# Patient Record
Sex: FEMALE | Race: WHITE | NOT HISPANIC OR LATINO
[De-identification: names, ages, dates, MRNs, and addresses within clinical notes are randomized per-mention and may not be internally consistent; named-entity substitution may affect disease eponyms.]

---

## 2019-12-12 ENCOUNTER — APPOINTMENT (OUTPATIENT)
Dept: ENDOCRINOLOGY | Facility: CLINIC | Age: 69
End: 2019-12-12
Payer: COMMERCIAL

## 2019-12-12 VITALS
SYSTOLIC BLOOD PRESSURE: 120 MMHG | HEART RATE: 75 BPM | BODY MASS INDEX: 31.83 KG/M2 | HEIGHT: 62 IN | OXYGEN SATURATION: 96 % | DIASTOLIC BLOOD PRESSURE: 60 MMHG | WEIGHT: 173 LBS

## 2019-12-12 DIAGNOSIS — Z80.8 FAMILY HISTORY OF MALIGNANT NEOPLASM OF OTHER ORGANS OR SYSTEMS: ICD-10-CM

## 2019-12-12 DIAGNOSIS — Z83.49 FAMILY HISTORY OF OTHER ENDOCRINE, NUTRITIONAL AND METABOLIC DISEASES: ICD-10-CM

## 2019-12-12 DIAGNOSIS — Z83.518 FAMILY HISTORY OF OTHER SPECIFIED EYE DISORDER: ICD-10-CM

## 2019-12-12 DIAGNOSIS — Z80.0 FAMILY HISTORY OF MALIGNANT NEOPLASM OF DIGESTIVE ORGANS: ICD-10-CM

## 2019-12-12 DIAGNOSIS — Z80.52 FAMILY HISTORY OF MALIGNANT NEOPLASM OF BLADDER: ICD-10-CM

## 2019-12-12 DIAGNOSIS — Z86.39 PERSONAL HISTORY OF OTHER ENDOCRINE, NUTRITIONAL AND METABOLIC DISEASE: ICD-10-CM

## 2019-12-12 DIAGNOSIS — Z82.62 FAMILY HISTORY OF OSTEOPOROSIS: ICD-10-CM

## 2019-12-12 DIAGNOSIS — M79.7 FIBROMYALGIA: ICD-10-CM

## 2019-12-12 DIAGNOSIS — Z78.9 OTHER SPECIFIED HEALTH STATUS: ICD-10-CM

## 2019-12-12 PROCEDURE — 99214 OFFICE O/P EST MOD 30 MIN: CPT

## 2019-12-20 PROBLEM — M79.7 FIBROMYALGIA: Status: ACTIVE | Noted: 2019-12-12

## 2019-12-20 NOTE — HISTORY OF PRESENT ILLNESS
[FreeTextEntry1] : Ms. BRAYDEN BRITT   is a 69 year  year old  female who returns today in follow up with regard to a history of hypothyroidism.  Hypothyroidism has been present for over 20 years. Mother has history of thyroid disease. She  is currently taking Levothyroxine 50 mcg daily.  Recent tsh returned at 50 per her primary physician,  Dr. Susana Smalls.She has been compliant in taking the LT4 daily, away from food or any medication that may inhibit absorption. She  has tolerated this medication well without any apparent adverse effects.  She denies any temperature intolerance, significant weight changes, or severe fatigue. She  in addition denies any palpitations, tremors, anxiousness, change in bowel habits or significant change in moods. \par \par She too has several year hx of Priamry HPT with calcium per pt's record on her phone  showing values in the 11 range with latest per Dr. Smalls at 11.4. She states she knows about HPT around 10 yrs.\par Additional medical history includes : fibromyalgia, hyperthyroidism, Osteoporosis\par Patient feels she had 24 hr urine for calcium but cant locate-she will peruse.\par No hx  of kidney stones\par \par From dec 12/18 CTX on one occasion was elevated at 924.\par Notes extreme fatigue.  Lost a very gppd friend at age 62 to ALS\par Dexa: 2/2018\par T scores: L1-L4= -2.1\par Left femoral neck= 1.7\par left distal radius= -2.7 \par  since exam 11/28/16\par  lumbar spine decreased 4.0%\par  Left femoral neck increased  2.3%\par Since exam 12/29/11\par  lumbar spine ;  decreased 9.3%\par left femoral neck decreased 9.2%\par \par On weight watcher for the past two weeks and has loss 5 lbs.  \par Recent labs Tsh=  5.0; vitamin d 28.0., alexis = 11.4, A1c =5.1

## 2019-12-20 NOTE — PHYSICAL EXAM
[Alert] : alert [No Acute Distress] : no acute distress [Well Nourished] : well nourished [Normal Sclera/Conjunctiva] : normal sclera/conjunctiva [Well Developed] : well developed [No Proptosis] : no proptosis [EOMI] : extra ocular movement intact [No Thyroid Nodules] : there were no palpable thyroid nodules [Thyroid Not Enlarged] : the thyroid was not enlarged [Normal Oropharynx] : the oropharynx was normal [No Accessory Muscle Use] : no accessory muscle use [No Respiratory Distress] : no respiratory distress [Clear to Auscultation] : lungs were clear to auscultation bilaterally [Normal Rate] : heart rate was normal  [Normal S1, S2] : normal S1 and S2 [Regular Rhythm] : with a regular rhythm [Pedal Pulses Normal] : the pedal pulses are present [Normal Bowel Sounds] : normal bowel sounds [No Edema] : there was no peripheral edema [Not Tender] : non-tender [Not Distended] : not distended [Soft] : abdomen soft [Post Cervical Nodes] : posterior cervical nodes [Anterior Cervical Nodes] : anterior cervical nodes [Axillary Nodes] : axillary nodes [No Spinal Tenderness] : no spinal tenderness [Spine Straight] : spine straight [Normal] : normal and non tender [Normal Gait] : normal gait [Normal Strength/Tone] : muscle strength and tone were normal [No Stigmata of Cushings Syndrome] : no stigmata of cushings syndrome [Normal Reflexes] : deep tendon reflexes were 2+ and symmetric [No Rash] : no rash [Oriented x3] : oriented to person, place, and time [No Tremors] : no tremors [Acanthosis Nigricans] : no acanthosis nigricans

## 2020-07-20 ENCOUNTER — APPOINTMENT (OUTPATIENT)
Dept: ENDOCRINOLOGY | Facility: CLINIC | Age: 70
End: 2020-07-20
Payer: COMMERCIAL

## 2020-07-20 VITALS
BODY MASS INDEX: 31.09 KG/M2 | RESPIRATION RATE: 16 BRPM | OXYGEN SATURATION: 98 % | HEART RATE: 74 BPM | TEMPERATURE: 97 F | WEIGHT: 170 LBS | SYSTOLIC BLOOD PRESSURE: 108 MMHG | DIASTOLIC BLOOD PRESSURE: 62 MMHG

## 2020-07-20 DIAGNOSIS — E04.1 NONTOXIC SINGLE THYROID NODULE: ICD-10-CM

## 2020-07-20 PROCEDURE — 99214 OFFICE O/P EST MOD 30 MIN: CPT

## 2020-07-20 NOTE — PHYSICAL EXAM
[Alert] : alert [Well Nourished] : well nourished [No Acute Distress] : no acute distress [Well Developed] : well developed [EOMI] : extra ocular movement intact [Normal Sclera/Conjunctiva] : normal sclera/conjunctiva [No Proptosis] : no proptosis [Thyroid Not Enlarged] : the thyroid was not enlarged [Normal Oropharynx] : the oropharynx was normal [No Respiratory Distress] : no respiratory distress [No Accessory Muscle Use] : no accessory muscle use [No Thyroid Nodules] : no palpable thyroid nodules [Clear to Auscultation] : lungs were clear to auscultation bilaterally [Normal S1, S2] : normal S1 and S2 [Regular Rhythm] : with a regular rhythm [Normal Rate] : heart rate was normal [No Edema] : no peripheral edema [Pedal Pulses Normal] : the pedal pulses are present [Normal Bowel Sounds] : normal bowel sounds [Not Tender] : non-tender [Not Distended] : not distended [Normal Posterior Cervical Nodes] : no posterior cervical lymphadenopathy [Soft] : abdomen soft [Normal Anterior Cervical Nodes] : no anterior cervical lymphadenopathy [No Spinal Tenderness] : no spinal tenderness [Normal Gait] : normal gait [No Stigmata of Cushings Syndrome] : no stigmata of Cushings Syndrome [Spine Straight] : spine straight [No Rash] : no rash [Normal Strength/Tone] : muscle strength and tone were normal [Normal Reflexes] : deep tendon reflexes were 2+ and symmetric [Oriented x3] : oriented to person, place, and time [No Tremors] : no tremors [Acanthosis Nigricans] : no acanthosis nigricans

## 2020-07-20 NOTE — HISTORY OF PRESENT ILLNESS
[FreeTextEntry1] : Ms. BRAYDEN BRITT   is a 70 year  year old  female who returns today in follow up with regard to a history of hypothyroidism.  Hypothyroidism has been present for over 20 years. M She  is currently taking Levothyroxine 50 mcg daily. PMD  Dr. Susana Smalls.She has been compliant in taking the LT4 daily, away from food or any medication that may inhibit absorption. She  has tolerated this medication well without any apparent adverse effects.  She denies any temperature intolerance, significant weight changes, or severe fatigue. She  in addition denies any palpitations, tremors, anxiousness, change in bowel habits or significant change in moods. \par \par She too has several year hx of Priamry HPT with calcium per pt's record on her phone  showing values in the 11 range with latest per Dr. Smalls at 11.4. She states she has known  abou theHPT around 10 yrs.\par Additional medical history includes : fibromyalgia, Osteoporosis\par recent labs at [facility]  with calcium 11.5  ALT 45  TSH wnl Vit d 25-Oh at 25\par No hx  of kidney stones\par 24 hr urien calcium done 8/2018  with value at 325\par From dec 12/18 CTX on one occasion was elevated at 924.\par Dexa: 07/01/2020\par T scores: L1-L4= -2.0\par Left femoral neck=  -2.2\par left distal radius= -2.7 \par Spine was stable from 2 yrs prior but FN decreased by 7.9%.\par Frax  19% 10 yr fx risk for spine and 3.6% for hip.\par \par

## 2020-08-10 ENCOUNTER — APPOINTMENT (OUTPATIENT)
Dept: SURGERY | Facility: CLINIC | Age: 70
End: 2020-08-10
Payer: COMMERCIAL

## 2020-08-10 ENCOUNTER — TRANSCRIPTION ENCOUNTER (OUTPATIENT)
Age: 70
End: 2020-08-10

## 2020-08-10 VITALS
HEIGHT: 62 IN | HEART RATE: 91 BPM | SYSTOLIC BLOOD PRESSURE: 114 MMHG | WEIGHT: 170 LBS | BODY MASS INDEX: 31.28 KG/M2 | DIASTOLIC BLOOD PRESSURE: 79 MMHG

## 2020-08-10 PROCEDURE — 99244 OFF/OP CNSLTJ NEW/EST MOD 40: CPT

## 2020-08-10 NOTE — HISTORY OF PRESENT ILLNESS
[de-identified] : Patient referred by Dr. Rodriguez for evaluation of primary hyperparathyroidism. Patient has had elevated calcium since 2011.  Blood work, July 2020: Creatinine 0.9, calcium 11.5, , vitamin D 20. 24-hour urine calcium 324. Bone density spine T -2.0, hip T -2.2, wrist T -2.7. Patient had thyroid biopsy 12 years ago benign. Patient reports reflux with sore throat, and occasional hoarseness, bone pain, fatigue, and memory difficulties. Denies kidney stones, hypertension, increased thirst, dysphagia, or radiation exposure.

## 2020-08-10 NOTE — ASSESSMENT
[FreeTextEntry1] : Patient with primary hyperparathyroidism, osteoporosis, and increasing symptoms. I recommended surgical intervention. I have requested a 4D CT scan to assist in preoperative localization.  I have reviewed the pathophysiology of the disease process, the area anatomy and the rationale for surgery.  I have discussed the risks, benefits and alternative treatments which include but are not limited to bleeding, infection, numbness, hoarseness, hypocalcemia, scarring, and need for reoperation. I have answered the patient's questions to their satisfaction. The patient wishes to proceed with recommended procedure.They will contact my office to schedule surgery.

## 2020-08-10 NOTE — CONSULT LETTER
[Dear  ___] : Dear  [unfilled], [Please see my note below.] : Please see my note below. [Consult Letter:] : I had the pleasure of evaluating your patient, [unfilled]. [Consult Closing:] : Thank you very much for allowing me to participate in the care of this patient.  If you have any questions, please do not hesitate to contact me. [FreeTextEntry3] : Sincerely yours,\par \par Carolyn Goss MD, FACS\par Assistant Professor of Surgery\par [facility] [FreeTextEntry2] : Dr. Rodney Rodriguez [DrShena  ___] : Dr. BURNS

## 2020-08-10 NOTE — PHYSICAL EXAM
[de-identified] : no cervical or supraclavicular adenopathy, trachea midline, thyroid without enlargement or palpable mass [de-identified] : Skin:  normal appearance.  no rash, nodules, vesicles, or erythema,\par Musculoskeletal:  full range of motion and no deformities appreciated\par Neurological:  grossly intact\par Psychiatric:  oriented to person, place and time with appropriate affect [Normal] : no neck adenopathy

## 2020-08-11 ENCOUNTER — TRANSCRIPTION ENCOUNTER (OUTPATIENT)
Age: 70
End: 2020-08-11

## 2020-08-11 RX ORDER — DENOSUMAB 60 MG/ML
60 INJECTION SUBCUTANEOUS
Qty: 1 | Refills: 0 | Status: DISCONTINUED | COMMUNITY
Start: 2020-07-28 | End: 2020-08-11

## 2020-08-20 ENCOUNTER — OUTPATIENT (OUTPATIENT)
Dept: OUTPATIENT SERVICES | Facility: HOSPITAL | Age: 70
LOS: 1 days | End: 2020-08-20
Payer: COMMERCIAL

## 2020-08-20 ENCOUNTER — RESULT REVIEW (OUTPATIENT)
Age: 70
End: 2020-08-20

## 2020-08-20 ENCOUNTER — APPOINTMENT (OUTPATIENT)
Dept: CT IMAGING | Facility: IMAGING CENTER | Age: 70
End: 2020-08-20
Payer: COMMERCIAL

## 2020-08-20 DIAGNOSIS — E21.3 HYPERPARATHYROIDISM, UNSPECIFIED: ICD-10-CM

## 2020-08-20 DIAGNOSIS — E03.9 HYPOTHYROIDISM, UNSPECIFIED: ICD-10-CM

## 2020-08-20 PROCEDURE — 70491 CT SOFT TISSUE NECK W/DYE: CPT | Mod: 26

## 2020-08-20 PROCEDURE — 70492 CT SFT TSUE NCK W/O & W/DYE: CPT

## 2020-09-09 ENCOUNTER — OUTPATIENT (OUTPATIENT)
Dept: OUTPATIENT SERVICES | Facility: HOSPITAL | Age: 70
LOS: 1 days | End: 2020-09-09
Payer: COMMERCIAL

## 2020-09-09 VITALS
OXYGEN SATURATION: 98 % | HEART RATE: 72 BPM | SYSTOLIC BLOOD PRESSURE: 115 MMHG | WEIGHT: 167.55 LBS | TEMPERATURE: 98 F | RESPIRATION RATE: 15 BRPM | HEIGHT: 62 IN | DIASTOLIC BLOOD PRESSURE: 76 MMHG

## 2020-09-09 DIAGNOSIS — Z98.890 OTHER SPECIFIED POSTPROCEDURAL STATES: Chronic | ICD-10-CM

## 2020-09-09 DIAGNOSIS — E21.0 PRIMARY HYPERPARATHYROIDISM: ICD-10-CM

## 2020-09-09 DIAGNOSIS — H26.9 UNSPECIFIED CATARACT: Chronic | ICD-10-CM

## 2020-09-09 DIAGNOSIS — E21.4 OTHER SPECIFIED DISORDERS OF PARATHYROID GLAND: ICD-10-CM

## 2020-09-09 DIAGNOSIS — Z01.818 ENCOUNTER FOR OTHER PREPROCEDURAL EXAMINATION: ICD-10-CM

## 2020-09-09 LAB
ANION GAP SERPL CALC-SCNC: 7 MMOL/L — SIGNIFICANT CHANGE UP (ref 5–17)
BUN SERPL-MCNC: 18 MG/DL — SIGNIFICANT CHANGE UP (ref 7–23)
CALCIUM SERPL-MCNC: 11.2 MG/DL — HIGH (ref 8.4–10.5)
CHLORIDE SERPL-SCNC: 107 MMOL/L — SIGNIFICANT CHANGE UP (ref 96–108)
CO2 SERPL-SCNC: 28 MMOL/L — SIGNIFICANT CHANGE UP (ref 22–31)
CREAT SERPL-MCNC: 0.86 MG/DL — SIGNIFICANT CHANGE UP (ref 0.5–1.3)
GLUCOSE SERPL-MCNC: 97 MG/DL — SIGNIFICANT CHANGE UP (ref 70–99)
HCT VFR BLD CALC: 38.9 % — SIGNIFICANT CHANGE UP (ref 34.5–45)
HGB BLD-MCNC: 12.6 G/DL — SIGNIFICANT CHANGE UP (ref 11.5–15.5)
MCHC RBC-ENTMCNC: 29.8 PG — SIGNIFICANT CHANGE UP (ref 27–34)
MCHC RBC-ENTMCNC: 32.4 GM/DL — SIGNIFICANT CHANGE UP (ref 32–36)
MCV RBC AUTO: 92 FL — SIGNIFICANT CHANGE UP (ref 80–100)
NRBC # BLD: 0 /100 WBCS — SIGNIFICANT CHANGE UP (ref 0–0)
PLATELET # BLD AUTO: 221 K/UL — SIGNIFICANT CHANGE UP (ref 150–400)
POTASSIUM SERPL-MCNC: 4.9 MMOL/L — SIGNIFICANT CHANGE UP (ref 3.5–5.3)
POTASSIUM SERPL-SCNC: 4.9 MMOL/L — SIGNIFICANT CHANGE UP (ref 3.5–5.3)
RBC # BLD: 4.23 M/UL — SIGNIFICANT CHANGE UP (ref 3.8–5.2)
RBC # FLD: 13 % — SIGNIFICANT CHANGE UP (ref 10.3–14.5)
SODIUM SERPL-SCNC: 142 MMOL/L — SIGNIFICANT CHANGE UP (ref 135–145)
WBC # BLD: 6.25 K/UL — SIGNIFICANT CHANGE UP (ref 3.8–10.5)
WBC # FLD AUTO: 6.25 K/UL — SIGNIFICANT CHANGE UP (ref 3.8–10.5)

## 2020-09-09 PROCEDURE — 93010 ELECTROCARDIOGRAM REPORT: CPT | Mod: NC

## 2020-09-09 NOTE — H&P PST ADULT - NSANTHOSAYNRD_GEN_A_CORE
No. LUCIEN screening performed.  STOP BANG Legend: 0-2 = LOW Risk; 3-4 = INTERMEDIATE Risk; 5-8 = HIGH Risk

## 2020-09-09 NOTE — H&P PST ADULT - NSICDXPASTSURGICALHX_GEN_ALL_CORE_FT
PAST SURGICAL HISTORY:  Bilateral cataracts     H/O basal cell carcinoma excision     H/O toe surgery right foot

## 2020-09-10 PROCEDURE — 93005 ELECTROCARDIOGRAM TRACING: CPT

## 2020-09-10 PROCEDURE — G0463: CPT

## 2020-09-10 PROCEDURE — 80048 BASIC METABOLIC PNL TOTAL CA: CPT

## 2020-09-10 PROCEDURE — 36415 COLL VENOUS BLD VENIPUNCTURE: CPT

## 2020-09-10 PROCEDURE — 85027 COMPLETE CBC AUTOMATED: CPT

## 2020-09-13 DIAGNOSIS — Z01.818 ENCOUNTER FOR OTHER PREPROCEDURAL EXAMINATION: ICD-10-CM

## 2020-09-15 ENCOUNTER — APPOINTMENT (OUTPATIENT)
Dept: DISASTER EMERGENCY | Facility: CLINIC | Age: 70
End: 2020-09-15

## 2020-09-16 LAB — SARS-COV-2 N GENE NPH QL NAA+PROBE: NOT DETECTED

## 2020-09-17 ENCOUNTER — TRANSCRIPTION ENCOUNTER (OUTPATIENT)
Age: 70
End: 2020-09-17

## 2020-09-18 ENCOUNTER — OUTPATIENT (OUTPATIENT)
Dept: OUTPATIENT SERVICES | Facility: HOSPITAL | Age: 70
LOS: 1 days | End: 2020-09-18
Payer: COMMERCIAL

## 2020-09-18 ENCOUNTER — APPOINTMENT (OUTPATIENT)
Dept: SURGERY | Facility: HOSPITAL | Age: 70
End: 2020-09-18

## 2020-09-18 ENCOUNTER — RESULT REVIEW (OUTPATIENT)
Age: 70
End: 2020-09-18

## 2020-09-18 VITALS
WEIGHT: 167.55 LBS | RESPIRATION RATE: 16 BRPM | SYSTOLIC BLOOD PRESSURE: 120 MMHG | HEART RATE: 72 BPM | TEMPERATURE: 98 F | HEIGHT: 62 IN | OXYGEN SATURATION: 97 % | DIASTOLIC BLOOD PRESSURE: 59 MMHG

## 2020-09-18 VITALS
TEMPERATURE: 98 F | HEART RATE: 90 BPM | DIASTOLIC BLOOD PRESSURE: 70 MMHG | OXYGEN SATURATION: 99 % | RESPIRATION RATE: 14 BRPM | SYSTOLIC BLOOD PRESSURE: 122 MMHG

## 2020-09-18 DIAGNOSIS — H26.9 UNSPECIFIED CATARACT: Chronic | ICD-10-CM

## 2020-09-18 DIAGNOSIS — E21.0 PRIMARY HYPERPARATHYROIDISM: ICD-10-CM

## 2020-09-18 DIAGNOSIS — Z98.890 OTHER SPECIFIED POSTPROCEDURAL STATES: Chronic | ICD-10-CM

## 2020-09-18 PROBLEM — M79.7 FIBROMYALGIA: Chronic | Status: ACTIVE | Noted: 2020-09-09

## 2020-09-18 PROBLEM — E03.9 HYPOTHYROIDISM, UNSPECIFIED: Chronic | Status: ACTIVE | Noted: 2020-09-09

## 2020-09-18 PROBLEM — M81.0 AGE-RELATED OSTEOPOROSIS WITHOUT CURRENT PATHOLOGICAL FRACTURE: Chronic | Status: ACTIVE | Noted: 2020-09-09

## 2020-09-18 LAB
PTH-INTACT IO % DIF SERPL: 139.3 PG/ML — HIGH (ref 15–65)
PTH-INTACT IO % DIF SERPL: 25 PG/ML — SIGNIFICANT CHANGE UP (ref 15–65)
PTH-INTACT IO % DIF SERPL: 27.7 PG/ML — SIGNIFICANT CHANGE UP (ref 15–65)
PTH-INTACT IO % DIF SERPL: 47.3 PG/ML — SIGNIFICANT CHANGE UP (ref 15–65)

## 2020-09-18 PROCEDURE — 88331 PATH CONSLTJ SURG 1 BLK 1SPC: CPT

## 2020-09-18 PROCEDURE — 60500 EXPLORE PARATHYROID GLANDS: CPT

## 2020-09-18 PROCEDURE — 88305 TISSUE EXAM BY PATHOLOGIST: CPT

## 2020-09-18 PROCEDURE — 88331 PATH CONSLTJ SURG 1 BLK 1SPC: CPT | Mod: 26

## 2020-09-18 PROCEDURE — 83970 ASSAY OF PARATHORMONE: CPT

## 2020-09-18 PROCEDURE — 88305 TISSUE EXAM BY PATHOLOGIST: CPT | Mod: 26

## 2020-09-18 PROCEDURE — 60500 EXPLORE PARATHYROID GLANDS: CPT | Mod: AS

## 2020-09-18 RX ORDER — ONDANSETRON 8 MG/1
4 TABLET, FILM COATED ORAL ONCE
Refills: 0 | Status: DISCONTINUED | OUTPATIENT
Start: 2020-09-18 | End: 2020-09-18

## 2020-09-18 RX ORDER — CHOLECALCIFEROL (VITAMIN D3) 125 MCG
2 CAPSULE ORAL
Qty: 0 | Refills: 0 | DISCHARGE

## 2020-09-18 RX ORDER — SODIUM CHLORIDE 9 MG/ML
1000 INJECTION, SOLUTION INTRAVENOUS
Refills: 0 | Status: DISCONTINUED | OUTPATIENT
Start: 2020-09-18 | End: 2020-09-18

## 2020-09-18 RX ORDER — HYDROMORPHONE HYDROCHLORIDE 2 MG/ML
0.5 INJECTION INTRAMUSCULAR; INTRAVENOUS; SUBCUTANEOUS ONCE
Refills: 0 | Status: DISCONTINUED | OUTPATIENT
Start: 2020-09-18 | End: 2020-09-18

## 2020-09-18 RX ORDER — OXYCODONE HYDROCHLORIDE 5 MG/1
5 TABLET ORAL ONCE
Refills: 0 | Status: DISCONTINUED | OUTPATIENT
Start: 2020-09-18 | End: 2020-09-18

## 2020-09-18 RX ORDER — LEVOTHYROXINE SODIUM 125 MCG
1 TABLET ORAL
Qty: 0 | Refills: 0 | DISCHARGE

## 2020-09-18 RX ORDER — BENZOCAINE AND MENTHOL 5; 1 G/100ML; G/100ML
1 LIQUID ORAL
Refills: 0 | Status: DISCONTINUED | OUTPATIENT
Start: 2020-09-18 | End: 2020-10-02

## 2020-09-18 RX ORDER — CHOLECALCIFEROL (VITAMIN D3) 125 MCG
0 CAPSULE ORAL
Qty: 0 | Refills: 0 | DISCHARGE

## 2020-09-18 RX ADMIN — SODIUM CHLORIDE 50 MILLILITER(S): 9 INJECTION, SOLUTION INTRAVENOUS at 08:17

## 2020-09-18 RX ADMIN — BENZOCAINE AND MENTHOL 1 LOZENGE: 5; 1 LIQUID ORAL at 12:08

## 2020-09-18 NOTE — ASU DISCHARGE PLAN (ADULT/PEDIATRIC) - CALL YOUR DOCTOR IF YOU HAVE ANY OF THE FOLLOWING:
Fever greater than (need to indicate Fahrenheit or Celsius)/Wound/Surgical Site with redness, or foul smelling discharge or pus/Swelling that gets worse/Inability to tolerate liquids or foods/Pain not relieved by Medications/Bleeding that does not stop/Unable to urinate/Nausea and vomiting that does not stop

## 2020-09-18 NOTE — ASU PATIENT PROFILE, ADULT - PSH
Bilateral cataracts    H/O basal cell carcinoma excision    H/O toe surgery  right foot   Bilateral cataracts    H/O basal cell carcinoma excision  chest area  H/O toe surgery  right foot

## 2020-09-18 NOTE — ASU DISCHARGE PLAN (ADULT/PEDIATRIC) - ASU DC SPECIAL INSTRUCTIONSFT
Follow up with Dr. Goss within 1 week, call office for appointment.   Take OTC Tylenol 325mg, 2 tabs by mouth every 6 hours for Pain.   Wear supportive bra for 2 weeks day and night to reduce tension on incision. .

## 2020-09-18 NOTE — ASU DISCHARGE PLAN (ADULT/PEDIATRIC) - NURSING INSTRUCTIONS
drink plenty of fluids,  do not exceed  3 gm Tylenol in 24 hrs drink plenty of fluids,  do not exceed  3 gm Tylenol in 24 hrs  use ice packs as needed for pain and edema control 15-20- min on/off

## 2020-09-22 LAB — SURGICAL PATHOLOGY STUDY: SIGNIFICANT CHANGE UP

## 2020-09-24 ENCOUNTER — APPOINTMENT (OUTPATIENT)
Dept: SURGERY | Facility: CLINIC | Age: 70
End: 2020-09-24
Payer: COMMERCIAL

## 2020-09-24 PROCEDURE — 36415 COLL VENOUS BLD VENIPUNCTURE: CPT

## 2020-09-24 PROCEDURE — 99024 POSTOP FOLLOW-UP VISIT: CPT

## 2020-09-24 NOTE — ASSESSMENT
[FreeTextEntry1] : Patient with primary hyperparathyroidism, osteoporosis, and increasing symptoms.Doing well psot op ,jefferson sent, RTO 6 weeks

## 2020-09-24 NOTE — HISTORY OF PRESENT ILLNESS
[de-identified] : Patient referred by Dr. Rodriguez for evaluation of primary hyperparathyroidism. Patient has had elevated calcium since 2011.  Blood work, July 2020: Creatinine 0.9, calcium 11.5, , vitamin D 20. 24-hour urine calcium 324. Bone density spine T -2.0, hip T -2.2, wrist T -2.7. Patient had thyroid biopsy 12 years ago benign. Patient reports reflux with sore throat, and occasional hoarseness, bone pain, fatigue, and memory difficulties. Denies kidney stones, hypertension, increased thirst, dysphagia, or radiation exposure.\par 9/18/2020 left inferior parathyroidectomy.  denies dysphagia, hoarseness or parathesias. path pending.

## 2020-09-24 NOTE — PHYSICAL EXAM
[de-identified] : no cervical or supraclavicular adenopathy, trachea midline, thyroid without enlargement or palpable mass, incision with mild swelling, scar min discussed [Normal] : no neck adenopathy [de-identified] : Skin:  normal appearance.  no rash, nodules, vesicles, or erythema,\par Musculoskeletal:  full range of motion and no deformities appreciated\par Neurological:  grossly intact\par Psychiatric:  oriented to person, place and time with appropriate affect

## 2020-09-29 LAB
25(OH)D3 SERPL-MCNC: 28.5 NG/ML
CALCIUM SERPL-MCNC: 10.3 MG/DL
CALCIUM SERPL-MCNC: 10.3 MG/DL
PARATHYROID HORMONE INTACT: 32 PG/ML

## 2020-10-21 ENCOUNTER — APPOINTMENT (OUTPATIENT)
Dept: ENDOCRINOLOGY | Facility: CLINIC | Age: 70
End: 2020-10-21
Payer: COMMERCIAL

## 2020-10-21 VITALS
HEART RATE: 68 BPM | DIASTOLIC BLOOD PRESSURE: 80 MMHG | WEIGHT: 171 LBS | SYSTOLIC BLOOD PRESSURE: 118 MMHG | TEMPERATURE: 97.9 F | BODY MASS INDEX: 31.47 KG/M2 | RESPIRATION RATE: 16 BRPM | HEIGHT: 62 IN | OXYGEN SATURATION: 99 %

## 2020-10-21 PROCEDURE — 99214 OFFICE O/P EST MOD 30 MIN: CPT | Mod: 25

## 2020-10-21 PROCEDURE — 99072 ADDL SUPL MATRL&STAF TM PHE: CPT

## 2020-10-21 PROCEDURE — 36415 COLL VENOUS BLD VENIPUNCTURE: CPT

## 2020-11-03 ENCOUNTER — APPOINTMENT (OUTPATIENT)
Dept: SURGERY | Facility: CLINIC | Age: 70
End: 2020-11-03
Payer: COMMERCIAL

## 2020-11-03 PROCEDURE — 99024 POSTOP FOLLOW-UP VISIT: CPT

## 2020-11-03 NOTE — HISTORY OF PRESENT ILLNESS
[de-identified] : Patient referred by Dr. Rodriguez for evaluation of primary hyperparathyroidism. Patient has had elevated calcium since 2011.  Blood work, July 2020: Creatinine 0.9, calcium 11.5, , vitamin D 20. 24-hour urine calcium 324. Bone density spine T -2.0, hip T -2.2, wrist T -2.7. Patient had thyroid biopsy 12 years ago benign. Patient reports reflux with sore throat, and occasional hoarseness, bone pain, fatigue, and memory difficulties. Denies kidney stones, hypertension, increased thirst, dysphagia, or radiation exposure.\par 9/18/2020 left inferior parathyroidectomy.  denies dysphagia, hoarseness or parathesias. path benign. currently on calcium 500 and vit S 5000, jefferson by brenda reviewed.

## 2020-11-03 NOTE — PHYSICAL EXAM
[de-identified] : no cervical or supraclavicular adenopathy, trachea midline, thyroid without enlargement or palpable mass, incision healing well. , scar min discussed [Normal] : no neck adenopathy [de-identified] : Skin:  normal appearance.  no rash, nodules, vesicles, or erythema,\par Musculoskeletal:  full range of motion and no deformities appreciated\par Neurological:  grossly intact\par Psychiatric:  oriented to person, place and time with appropriate affect

## 2020-11-03 NOTE — ASSESSMENT
[FreeTextEntry1] : Patient with primary hyperparathyroidism, osteoporosis, and increasing symptoms.Doing well psot op liss lstop calcium rto 4 mo

## 2020-11-06 ENCOUNTER — NON-APPOINTMENT (OUTPATIENT)
Age: 70
End: 2020-11-06

## 2020-11-12 NOTE — HISTORY OF PRESENT ILLNESS
[FreeTextEntry1] : Ms. BRAYDEN BRITT   is a 70 year  year old  female who returns today in follow up with regard to a history of hypothyroidism and Priamry HPt. M She  is currently taking Levothyroxine 50 mcg daily. PMD  Dr. Susana Smalls.She has been compliant in taking the LT4 daily, away from food or any medication that may inhibit absorption. She  has tolerated this medication well without any apparent adverse effects.  She denies any temperature intolerance, significant weight changes, or severe fatigue. She  in addition denies any palpitations, tremors, anxiousness, change in bowel habits or significant change in moods. \par She is s/p parathyroidectomy per Dr. Goss carried out on 09/18/2020 with recent labs from 10/10/2020 with calcium at 9.8 and  PTH at 97 with tft's wnl-Vit d 25-OH was low at 26.  We did , last week, add an additional 3,000 iu vit d to her baseline of 3800iu. PTH pre op had been in the 200 range.\par Is now taking one calcium pill 333 mg and multi with 200 mg Ca\par \par Does have hx of osteoporosis, had prior elevated urinary calcium and too hx oif fatty liver with mild lft elevations\par Is doing Pt\par \par

## 2020-12-21 ENCOUNTER — APPOINTMENT (OUTPATIENT)
Dept: ENDOCRINOLOGY | Facility: CLINIC | Age: 70
End: 2020-12-21
Payer: COMMERCIAL

## 2020-12-21 VITALS
WEIGHT: 161 LBS | HEART RATE: 68 BPM | OXYGEN SATURATION: 99 % | DIASTOLIC BLOOD PRESSURE: 72 MMHG | BODY MASS INDEX: 29.63 KG/M2 | SYSTOLIC BLOOD PRESSURE: 112 MMHG | HEIGHT: 62 IN | RESPIRATION RATE: 16 BRPM

## 2020-12-21 PROCEDURE — 99214 OFFICE O/P EST MOD 30 MIN: CPT

## 2020-12-21 PROCEDURE — 99072 ADDL SUPL MATRL&STAF TM PHE: CPT

## 2020-12-21 NOTE — HISTORY OF PRESENT ILLNESS
[FreeTextEntry1] : Ms. BRAYDEN BRITT   is a 70 year  year old  female who returns today in follow up with regard to a history of hypothyroidism and Priamry HPt.  She  is currently taking Levothyroxine 50 mcg  alt with 75 mcg.She has been compliant in taking the LT4 daily, away from food or any medication that may inhibit absorption. She  has tolerated this medication well without any apparent adverse effects.  She denies any temperature intolerance, significant weight changes, or severe fatigue. She  in addition denies any palpitations, tremors, anxiousness, change in bowel habits or significant change in moods. \par She is s/p parathyroidectomy per Dr. Goss carried out on 09/18/2020 with calcium normal at 9.2 and PTH up at 90 with tft's wnl-Vit d 25-OH was low at 26.  We did , last week, add an additional 3,000 iu vit d to her baseline of 3800iu. PTH pre op had been in the 200 range.\par Is now taking one calcium pill 333 mg and multi with 200 mg Ca\par  Dr. Susana Smalls is her pmd.\par Doing Pt and feeling much stronger and energy much better.\par Does have hx of osteoporosis, had prior elevated urinary calcium and too hx oif fatty liver with mild lft elevations\par Is doing Pt\par \par

## 2021-02-07 ENCOUNTER — NON-APPOINTMENT (OUTPATIENT)
Age: 71
End: 2021-02-07

## 2021-02-24 ENCOUNTER — NON-APPOINTMENT (OUTPATIENT)
Age: 71
End: 2021-02-24

## 2021-03-02 ENCOUNTER — APPOINTMENT (OUTPATIENT)
Dept: SURGERY | Facility: CLINIC | Age: 71
End: 2021-03-02
Payer: COMMERCIAL

## 2021-03-02 PROCEDURE — 99214 OFFICE O/P EST MOD 30 MIN: CPT

## 2021-03-02 PROCEDURE — 99072 ADDL SUPL MATRL&STAF TM PHE: CPT

## 2021-03-02 NOTE — HISTORY OF PRESENT ILLNESS
[de-identified] : Patient referred by Dr. Rodriguez for evaluation of primary hyperparathyroidism. Patient has had elevated calcium since 2011.  Blood work, July 2020: Creatinine 0.9, calcium 11.5, , vitamin D 20. 24-hour urine calcium 324. Bone density spine T -2.0, hip T -2.2, wrist T -2.7. Patient had thyroid biopsy 12 years ago benign. Patient reports reflux with sore throat, and occasional hoarseness, bone pain, fatigue, and memory difficulties. Denies kidney stones, hypertension, increased thirst, dysphagia, or radiation exposure.\par 9/18/2020 left inferior parathyroidectomy.  PTH fell from 139 to 27 intraop.   denies dysphagia, hoarseness or parathesias. path benign. currently on calcium 200 and vit S 5000, jefferson by brenda reviewed. ca 10.1, PTH 78. vit D 44 .  denies bone pain, fatigue, hematuria or reflux.

## 2021-03-02 NOTE — ASSESSMENT
[FreeTextEntry1] : Patient with primary hyperparathyroidism, osteoporosis, and increasing symptoms.Doing well postop.  possible persistent PHPT.   rto 4 mo ,  if levels remain elevated will consider repeat 4D CT scan and possible additional surgery.

## 2021-03-22 ENCOUNTER — TRANSCRIPTION ENCOUNTER (OUTPATIENT)
Age: 71
End: 2021-03-22

## 2021-04-05 ENCOUNTER — APPOINTMENT (OUTPATIENT)
Dept: ENDOCRINOLOGY | Facility: CLINIC | Age: 71
End: 2021-04-05
Payer: COMMERCIAL

## 2021-04-05 PROCEDURE — 99214 OFFICE O/P EST MOD 30 MIN: CPT | Mod: 95

## 2021-04-18 NOTE — HISTORY OF PRESENT ILLNESS
[FreeTextEntry1] : Ms. BRAYDEN BRITT   is a 70 year   old  female who returns today in follow up via telehealth with regard to a history of hypothyroidism and Primary HPt.  We tried, but were unable to successfully connect with [employer].\par  She  is currently taking Levothyroxine 50 mcg  alt with 75 mcg.She has been compliant in taking the LT4 daily, away from food or any medication that may inhibit absorption. She  has tolerated this medication well without any apparent adverse effects.  She denies any temperature intolerance, significant weight changes, or severe fatigue. She  in addition denies any palpitations, tremors, anxiousness, change in bowel habits or significant change in moods. \par She is s/p parathyroidectomy per Dr. Goss carried out on 09/18/2020 with calcium normal  but PTH still remaining slightly elevated -but had been up to the 200 range pre operatively.\par Is now taking one calcium pill 333 mg and multi with 200 mg Ca\par  Dr. Susana Smalls is her pmd.\par ON 2/11/2021 tsh was 2.96 with too, normal Free T4 at 1.4 with PTH intact at 78 with calcium at 10.1 and vit d 25-OH t 44\par Doing Pt and feeling much stronger and energy much better.\par Does have hx of osteoporosis, had prior elevated urinary calcium and too hx oi fatty liver with mild lft elevations\par Needs a revision of her toe surg rt foot per Dr. Son Barnes\par Weight going down with diet changes  At 158-wants to get to 150.\par Somewhat depressed\par seeing a therapist\par Working remotely.\par Lonely\par Had both vaccines.\par \par \par \par \par

## 2021-04-23 ENCOUNTER — NON-APPOINTMENT (OUTPATIENT)
Age: 71
End: 2021-04-23

## 2021-06-15 ENCOUNTER — APPOINTMENT (OUTPATIENT)
Dept: SURGERY | Facility: CLINIC | Age: 71
End: 2021-06-15
Payer: COMMERCIAL

## 2021-06-15 PROCEDURE — 99213 OFFICE O/P EST LOW 20 MIN: CPT | Mod: 95

## 2021-06-15 NOTE — PHYSICAL EXAM
[Normal] : no neck adenopathy [de-identified] : no cervical or supraclavicular adenopathy, trachea midline, thyroid without enlargement or palpable mass, incision healing well. , scar min discussed [de-identified] : Skin:  normal appearance.  no rash, nodules, vesicles, or erythema,\par Musculoskeletal:  full range of motion and no deformities appreciated\par Neurological:  grossly intact\par Psychiatric:  oriented to person, place and time with appropriate affect

## 2021-06-15 NOTE — ASSESSMENT
[FreeTextEntry1] : Patient with primary hyperparathyroidism, osteoporosis, and increasing symptoms.Doing well postop.  possible persistent PHPT.   rto 6 mo ,  if levels remain elevated will consider repeat 4D CT scan and possible additional surgery.

## 2021-06-15 NOTE — HISTORY OF PRESENT ILLNESS
[Home] : at home, [unfilled] , at the time of the visit. [Medical Office: ([facility])___] : at the medical office located in  [Verbal consent obtained from patient] : the patient, [unfilled] [de-identified] : Patient referred by Dr. Rodriguez for evaluation of primary hyperparathyroidism. Patient has had elevated calcium since 2011.  Blood work, July 2020: Creatinine 0.9, calcium 11.5, , vitamin D 20. 24-hour urine calcium 324. Bone density spine T -2.0, hip T -2.2, wrist T -2.7. Patient had thyroid biopsy 12 years ago benign. Patient reports reflux with sore throat, and occasional hoarseness, bone pain, fatigue, and memory difficulties. Denies kidney stones, hypertension, increased thirst, dysphagia, or radiation exposure.\par 9/18/2020 left inferior parathyroidectomy.  PTH fell from 139 to 27 intraop.   denies dysphagia, hoarseness or parathesias. path benign. currently on calcium 200 and vit S 5000, jefferson by brenda reviewed. ca 10.1, PTH 78. vit D 44 .  denies bone pain, fatigue, hematuria or reflux. repeat jefferson 4/2021 ca 10. PTH 68, Vit d 40.  on 5000 vit D daily.  reports fatigue, had foot surgery 6 weeks about. I have reviewed all old and new data and available images.

## 2021-11-02 ENCOUNTER — NON-APPOINTMENT (OUTPATIENT)
Age: 71
End: 2021-11-02

## 2021-11-29 ENCOUNTER — NON-APPOINTMENT (OUTPATIENT)
Age: 71
End: 2021-11-29

## 2021-12-10 ENCOUNTER — NON-APPOINTMENT (OUTPATIENT)
Age: 71
End: 2021-12-10

## 2021-12-13 ENCOUNTER — NON-APPOINTMENT (OUTPATIENT)
Age: 71
End: 2021-12-13

## 2021-12-16 ENCOUNTER — APPOINTMENT (OUTPATIENT)
Dept: SURGERY | Facility: CLINIC | Age: 71
End: 2021-12-16
Payer: COMMERCIAL

## 2021-12-16 PROCEDURE — 99213 OFFICE O/P EST LOW 20 MIN: CPT

## 2021-12-16 NOTE — PHYSICAL EXAM
[de-identified] : no cervical or supraclavicular adenopathy, trachea midline, thyroid without enlargement or palpable mass, incision healing well. , scar min discussed [Normal] : no neck adenopathy [de-identified] : Skin:  normal appearance.  no rash, nodules, vesicles, or erythema,\par Musculoskeletal:  full range of motion and no deformities appreciated\par Neurological:  grossly intact\par Psychiatric:  oriented to person, place and time with appropriate affect

## 2021-12-16 NOTE — ASSESSMENT
[FreeTextEntry1] : Patient with h/o primary hyperparathyroidism, osteoporosis,.  Recent blood work within normal range.  We will continue to observe..   rto 1 year.  If levels become elevated will consider repeat 4D CT scan and possible additional surgery.  I have answered their questions to the best of my ability.\par

## 2021-12-16 NOTE — HISTORY OF PRESENT ILLNESS
[de-identified] : Patient referred by Dr. Rodriguez for evaluation of primary hyperparathyroidism. Patient has had elevated calcium since 2011.  Blood work, July 2020: Creatinine 0.9, calcium 11.5, , vitamin D 20. 24-hour urine calcium 324. Bone density spine T -2.0, hip T -2.2, wrist T -2.7. Patient had thyroid biopsy 12 years ago benign. Patient reports reflux with sore throat, and occasional hoarseness, bone pain, fatigue, and memory difficulties. Denies kidney stones, hypertension, increased thirst, dysphagia, or radiation exposure.\par 9/18/2020 left inferior parathyroidectomy.  PTH fell from 139 to 27 intraop.   denies dysphagia, hoarseness or parathesias. path benign. currently on calcium 200 and vit S 5000, jefferson by brenda reviewed. ca 10.1, PTH 78. vit D 44 .  denies bone pain, fatigue, hematuria or reflux. repeat jefferson 4/2021 ca 10. PTH 68, Vit d 40.  and jefferson 12/2021 Ca 10, PTH 55, vit D 38,  on 5000 vit D daily.  reports fatigue, was fired from job recently with some depression. I have reviewed all old and new data and available images.  [Medical Office: ([facility])___] : at the medical office located in

## 2021-12-22 ENCOUNTER — APPOINTMENT (OUTPATIENT)
Dept: ENDOCRINOLOGY | Facility: CLINIC | Age: 71
End: 2021-12-22
Payer: COMMERCIAL

## 2021-12-22 VITALS
OXYGEN SATURATION: 95 % | DIASTOLIC BLOOD PRESSURE: 68 MMHG | HEART RATE: 78 BPM | RESPIRATION RATE: 15 BRPM | WEIGHT: 158 LBS | SYSTOLIC BLOOD PRESSURE: 124 MMHG | BODY MASS INDEX: 28.9 KG/M2 | TEMPERATURE: 98.6 F

## 2021-12-22 PROCEDURE — 99214 OFFICE O/P EST MOD 30 MIN: CPT

## 2021-12-25 NOTE — HISTORY OF PRESENT ILLNESS
[FreeTextEntry1] : Ms. BRAYDEN BRITT is a 71 year old female who returns today in follow up via telehealth with regard to a history of hypothyroidism and Primary HPT.\par She is currently taking Levothyroxine 50 mcg alt with 75 mcg. She has been compliant in taking the LT4 daily, away from food or any medication that may inhibit absorption. She has tolerated this medication well without any apparent adverse effects. She denies any temperature intolerance, significant weight changes, or severe fatigue. She in addition denies any palpitations, tremors, anxiousness, change in bowel habits or significant change in moods. \par She is s/p parathyroidectomy per Dr. Goss carried out on 09/18/2020 with calcium normal but PTH still remaining slightly elevated -but had been up to the 200 range pre operatively.\par Vitamin D 25-OH returned at 38, PTH 55, Calcium 10 on 12/03/2021. TSH returned at 2.8\par Is now taking one calcium pill 333 mg and multi with 200 mg Ca\par Dr. Susana Smalls is her pmd.\par She is carrying out PT. and feeling much stronger and energy much better.\par Does have hx of osteoporosis, had prior elevated urinary calcium and too hx oi fatty liver with mild lft elevations\par Needs a revision of her toe surg rt foot per Dr. Son Barnes\par Weight going down with diet changes At 158-wants to get to 150.\par Somewhat depressed; Lost her job.\par seeing a therapist\par Working remotely.\par Lonely\par Had both vaccines.\par She was placed on Rosuvastatin, but has not started yet.

## 2022-05-16 ENCOUNTER — NON-APPOINTMENT (OUTPATIENT)
Age: 72
End: 2022-05-16

## 2022-08-11 ENCOUNTER — NON-APPOINTMENT (OUTPATIENT)
Age: 72
End: 2022-08-11

## 2022-08-15 ENCOUNTER — APPOINTMENT (OUTPATIENT)
Dept: CARDIOLOGY | Facility: CLINIC | Age: 72
End: 2022-08-15

## 2022-08-15 ENCOUNTER — NON-APPOINTMENT (OUTPATIENT)
Age: 72
End: 2022-08-15

## 2022-08-15 VITALS
BODY MASS INDEX: 29 KG/M2 | DIASTOLIC BLOOD PRESSURE: 60 MMHG | HEIGHT: 62 IN | TEMPERATURE: 98.5 F | SYSTOLIC BLOOD PRESSURE: 110 MMHG | WEIGHT: 157.56 LBS | HEART RATE: 55 BPM | OXYGEN SATURATION: 97 %

## 2022-08-15 DIAGNOSIS — J02.9 ACUTE PHARYNGITIS, UNSPECIFIED: ICD-10-CM

## 2022-08-15 DIAGNOSIS — R35.0 FREQUENCY OF MICTURITION: ICD-10-CM

## 2022-08-15 DIAGNOSIS — Z00.00 ENCOUNTER FOR GENERAL ADULT MEDICAL EXAMINATION W/OUT ABNORMAL FINDINGS: ICD-10-CM

## 2022-08-15 DIAGNOSIS — R21 RASH AND OTHER NONSPECIFIC SKIN ERUPTION: ICD-10-CM

## 2022-08-15 PROCEDURE — 93000 ELECTROCARDIOGRAM COMPLETE: CPT

## 2022-08-15 PROCEDURE — 99204 OFFICE O/P NEW MOD 45 MIN: CPT

## 2022-08-15 NOTE — HISTORY OF PRESENT ILLNESS
[FreeTextEntry1] : 71 yo female with hypothyroid, Hyperparathyroid, HLD, Fatty Liver presents today te reestablish care in our office.\par \par She states in July she was experiencing LLQ abdominal pain and had CT abdomen which showed diverticulitis as well as a trace- small pericardial effusion. She was treated by GI Dr. Burciaga and GI symptoms have resolved. \par \par She is complaining of urinary frequency.\par \par She is also complaining of rash.\par \par The patient is here for follow-up of hypothyroidism. The patient states that they are taking their medicine regularly that they have no new complaints. They deny any  heat or cold intolerance hair loss and fatigue.\par \par The patient is here for follow-up of known fatty liver  disease with abnormal LFTs they are already low calorie diet and trying to lose weight and denies any symptoms of right upper quadrant pain.  They also are aware that they should the avoiding any hepatotoxic medications and ETOH.\par \par The patient is here for follow-up of elevated cholesterol.  The patient is trying to follow a low cholesterol diet.\par

## 2022-08-23 ENCOUNTER — APPOINTMENT (OUTPATIENT)
Dept: CARDIOLOGY | Facility: CLINIC | Age: 72
End: 2022-08-23

## 2022-08-23 PROCEDURE — 93306 TTE W/DOPPLER COMPLETE: CPT

## 2022-08-23 PROCEDURE — 93880 EXTRACRANIAL BILAT STUDY: CPT

## 2022-08-24 ENCOUNTER — APPOINTMENT (OUTPATIENT)
Dept: CT IMAGING | Facility: CLINIC | Age: 72
End: 2022-08-24

## 2022-08-24 ENCOUNTER — OUTPATIENT (OUTPATIENT)
Dept: OUTPATIENT SERVICES | Facility: HOSPITAL | Age: 72
LOS: 1 days | End: 2022-08-24
Payer: SELF-PAY

## 2022-08-24 DIAGNOSIS — Z98.890 OTHER SPECIFIED POSTPROCEDURAL STATES: Chronic | ICD-10-CM

## 2022-08-24 DIAGNOSIS — H26.9 UNSPECIFIED CATARACT: Chronic | ICD-10-CM

## 2022-08-24 DIAGNOSIS — E78.00 PURE HYPERCHOLESTEROLEMIA, UNSPECIFIED: ICD-10-CM

## 2022-08-24 LAB
APPEARANCE: CLEAR
BACTERIA UR CULT: NORMAL
BACTERIA: NEGATIVE
BILIRUBIN URINE: NEGATIVE
BLOOD URINE: NEGATIVE
COLOR: YELLOW
GLUCOSE QUALITATIVE U: NEGATIVE
HYALINE CASTS: 0 /LPF
KETONES URINE: NEGATIVE
LEUKOCYTE ESTERASE URINE: NEGATIVE
MICROSCOPIC-UA: NORMAL
NITRITE URINE: NEGATIVE
PH URINE: 6
PROTEIN URINE: NEGATIVE
RED BLOOD CELLS URINE: 1 /HPF
SPECIFIC GRAVITY URINE: 1.02
SQUAMOUS EPITHELIAL CELLS: 0 /HPF
UROBILINOGEN URINE: NORMAL
WHITE BLOOD CELLS URINE: 1 /HPF

## 2022-08-24 PROCEDURE — 75571 CT HRT W/O DYE W/CA TEST: CPT

## 2022-08-24 PROCEDURE — 75571 CT HRT W/O DYE W/CA TEST: CPT | Mod: 26

## 2022-09-20 ENCOUNTER — APPOINTMENT (OUTPATIENT)
Dept: CARDIOLOGY | Facility: CLINIC | Age: 72
End: 2022-09-20

## 2022-09-20 VITALS
OXYGEN SATURATION: 95 % | TEMPERATURE: 98.1 F | HEART RATE: 72 BPM | HEIGHT: 62 IN | SYSTOLIC BLOOD PRESSURE: 110 MMHG | BODY MASS INDEX: 30.18 KG/M2 | WEIGHT: 164 LBS | DIASTOLIC BLOOD PRESSURE: 60 MMHG

## 2022-09-20 DIAGNOSIS — K76.0 FATTY (CHANGE OF) LIVER, NOT ELSEWHERE CLASSIFIED: ICD-10-CM

## 2022-09-20 DIAGNOSIS — Z23 ENCOUNTER FOR IMMUNIZATION: ICD-10-CM

## 2022-09-20 PROCEDURE — 99214 OFFICE O/P EST MOD 30 MIN: CPT

## 2022-09-20 PROCEDURE — 90662 IIV NO PRSV INCREASED AG IM: CPT

## 2022-09-20 PROCEDURE — G0008: CPT

## 2022-09-20 PROCEDURE — 93000 ELECTROCARDIOGRAM COMPLETE: CPT

## 2022-09-20 RX ORDER — CIPROFLOXACIN HYDROCHLORIDE 500 MG/1
500 TABLET, FILM COATED ORAL TWICE DAILY
Qty: 10 | Refills: 0 | Status: DISCONTINUED | COMMUNITY
Start: 2022-08-15 | End: 2022-09-20

## 2022-09-20 RX ORDER — MOMETASONE FUROATE 1 MG/G
0.1 CREAM TOPICAL TWICE DAILY
Qty: 1 | Refills: 0 | Status: DISCONTINUED | COMMUNITY
Start: 2022-08-15 | End: 2022-09-20

## 2022-09-21 NOTE — HISTORY OF PRESENT ILLNESS
[FreeTextEntry1] : Pt presents for follow up\par \par Pt was having RUQ pain at last visit. Pt saw Dr. Burciaga gastroenterologist at [facility] and had negative Fibroscan on 9/13. Pt does have a hx of diverticulitis. \par \par Pt did have recent bloodwork done 8/16 which showed LDL of 165 and TSH of 5.33, pt is followed by Dr. Rodriguez\par pt had calcium score which was 0\par non calcified lung nodule shown on CT, pt due for repeat CT chest in 3-6 months\par \par pt had echo on  8/23 which showed small pericardial effusion, was advised to repeat echo in 6 weeks\par \par pt had carotid doppler which showed mild plaque bilaterally

## 2022-09-21 NOTE — HISTORY OF PRESENT ILLNESS
[FreeTextEntry1] : Pt presents for follow up\par \par Pt was having RUQ pain at last visit. Pt saw Dr. Burciaga gastroenterologist at [facility] and had negative Fibroscan on 9/13. Pt does have a hx of diverticulitis. \par \par Pt did have recent bloodwork done 8/16 which showed LDL of 165 and TSH of 5.33, pt is followed by Dr. Rodriguez\par pt had calcium score which was 0\par non calcified lung nodule shown on CT, pt due for repeat CT chest in 3-6 months\par \par pt had echo on  8/23 which showed small pericardial effusion, was advised to repeat echo in 6 weeks\par \par pt had carotid doppler which showed mild plaque bilaterally

## 2022-10-26 ENCOUNTER — APPOINTMENT (OUTPATIENT)
Dept: CARDIOLOGY | Facility: CLINIC | Age: 72
End: 2022-10-26

## 2022-10-26 PROCEDURE — 93308 TTE F-UP OR LMTD: CPT

## 2022-10-26 RX ORDER — ROSUVASTATIN CALCIUM 5 MG/1
5 TABLET, FILM COATED ORAL
Qty: 90 | Refills: 0 | Status: DISCONTINUED | COMMUNITY
Start: 2021-12-13 | End: 2022-10-26

## 2022-12-15 ENCOUNTER — APPOINTMENT (OUTPATIENT)
Dept: SURGERY | Facility: CLINIC | Age: 72
End: 2022-12-15

## 2022-12-15 PROCEDURE — 99213 OFFICE O/P EST LOW 20 MIN: CPT

## 2022-12-15 NOTE — HISTORY OF PRESENT ILLNESS
[de-identified] : Patient referred by Dr. Rodriguez for evaluation of primary hyperparathyroidism. Patient has had elevated calcium since 2011.  Blood work, July 2020: Creatinine 0.9, calcium 11.5, , vitamin D 20. 24-hour urine calcium 324. Bone density spine T -2.0, hip T -2.2, wrist T -2.7. Patient had thyroid biopsy 12 years ago benign. Patient reports reflux with sore throat, and occasional hoarseness, bone pain, fatigue, and memory difficulties. Denies kidney stones, hypertension, increased thirst, dysphagia, or radiation exposure.\par 9/18/2020 left inferior parathyroidectomy.  PTH fell from 139 to 27 intraop.   denies dysphagia, hoarseness or parathesias. path benign. currently on calcium 200 and vit S 5000, jefferson by brenda reviewed. ca 10.1, PTH 78. vit D 44 .  denies bone pain, fatigue, hematuria or reflux. repeat jefferson 4/2021 ca 10. PTH 68, Vit d 40.  and jefferson 12/2021 Ca 10, PTH 55, vit D 38,  on 5000 vit D daily.  reports fatigue, blood work 11/2022 Ca 9.7, PTH 79, vit D not done.  complaining of fatigue with TSH mildly elevated   . I have reviewed all old and new data and available images.

## 2022-12-15 NOTE — ASSESSMENT
[FreeTextEntry1] : Patient with h/o primary hyperparathyroidism, osteoporosis,.  Recent blood work reviewed. patient will discusse with Dr Rodriguez increasing synthroid.  continue to monitor Ca/PTH and vit D.    If levels become elevated will consider repeat 4D CT scan and possible additional surgery.  I have answered their questions to the best of my ability.\par

## 2022-12-15 NOTE — PHYSICAL EXAM
[de-identified] : no cervical or supraclavicular adenopathy, trachea midline, thyroid without enlargement or palpable mass, incision healing well. , scar min discussed [Normal] : no neck adenopathy [de-identified] : Skin:  normal appearance.  no rash, nodules, vesicles, or erythema,\par Musculoskeletal:  full range of motion and no deformities appreciated\par Neurological:  grossly intact\par Psychiatric:  oriented to person, place and time with appropriate affect

## 2022-12-20 ENCOUNTER — APPOINTMENT (OUTPATIENT)
Dept: ENDOCRINOLOGY | Facility: CLINIC | Age: 72
End: 2022-12-20

## 2022-12-20 VITALS
SYSTOLIC BLOOD PRESSURE: 110 MMHG | DIASTOLIC BLOOD PRESSURE: 60 MMHG | BODY MASS INDEX: 29.81 KG/M2 | WEIGHT: 162 LBS | HEART RATE: 82 BPM | HEIGHT: 62 IN | TEMPERATURE: 97.8 F | OXYGEN SATURATION: 96 %

## 2022-12-20 PROCEDURE — 99214 OFFICE O/P EST MOD 30 MIN: CPT

## 2022-12-20 RX ORDER — LEVOTHYROXINE SODIUM 0.05 MG/1
50 TABLET ORAL
Qty: 45 | Refills: 1 | Status: DISCONTINUED | COMMUNITY
Start: 2019-12-12 | End: 2022-12-20

## 2022-12-26 NOTE — HISTORY OF PRESENT ILLNESS
[FreeTextEntry1] : Ms. BRAYDEN BRITT is a 71 year old female who returns today in follow up with regard hypothyroidism and Primary HPT.\par \par She is s/p parathyroidectomy per Dr. Goss carried out on 09/18/2020 with calcium normal but PTH still remaining slightly elevated -but had been up to the 200 range pre operatively.\par \par She is currently taking Levothyroxine 50 mcg alt with 75 mcg. She has been compliant in taking the LT4 daily, away from food or any medication that may inhibit absorption. She has tolerated this medication well without any apparent adverse effects. She denies any temperature intolerance, significant weight changes. She in addition denies any palpitations, tremors, anxiousness, change in bowel habits or significant change in moods. \par \par She does note fatigue and difficulty sleeping as of late. Does take CBD intermittently, (about every 2 weeks) and notices improvement. \par \par Lab data from 11/22/2022 showed TSH at 4.8 with Free T4 at 1.4 and intact PTH at 79 and serum calcium at 9.7\par Is now taking one calcium pill 333 mg and multi with 200 mg Ca, vitamin D 5,000 IU daily, biotin \par Dr. Susana Smalls is her pmd.\par \par She did carry out PT. and feeling much stronger and energy much better.\par Does have hx of osteoporosis, had prior elevated urinary calcium and too hx oi fatty liver with mild lft elevations\par \par Weight going down with diet changes At 158-wants to get to 150.\par Somewhat depressed;\par seeing a therapist\par Now retired. \par Had Covid vaccines.\par She was placed on Rosuvastatin

## 2023-01-23 ENCOUNTER — NON-APPOINTMENT (OUTPATIENT)
Age: 73
End: 2023-01-23

## 2023-02-14 ENCOUNTER — APPOINTMENT (OUTPATIENT)
Dept: CARDIOLOGY | Facility: CLINIC | Age: 73
End: 2023-02-14
Payer: MEDICARE

## 2023-02-14 VITALS
SYSTOLIC BLOOD PRESSURE: 102 MMHG | BODY MASS INDEX: 29.81 KG/M2 | OXYGEN SATURATION: 97 % | DIASTOLIC BLOOD PRESSURE: 70 MMHG | HEART RATE: 67 BPM | HEIGHT: 62 IN | WEIGHT: 162 LBS

## 2023-02-14 PROCEDURE — 99214 OFFICE O/P EST MOD 30 MIN: CPT

## 2023-02-14 PROCEDURE — 93306 TTE W/DOPPLER COMPLETE: CPT

## 2023-02-14 PROCEDURE — 93000 ELECTROCARDIOGRAM COMPLETE: CPT

## 2023-02-15 NOTE — HISTORY OF PRESENT ILLNESS
[FreeTextEntry1] : Aniya is a 72 y.o female, retired, w/PMHx of HLD, HPT, Hypothyroid who presents for routine follow up. PCP Dr. Smalls. 10/26/2023 TTE, nL LVEF, small persistent pericardial effusion noted, pericardial also noted on 8/23/2022 TTE. Notes some episodes of dizziness, occurs in morning w/getting OOB. Denies chest pain, palpitations, diaphoresis, vision changes, HA, syncope, cough, wheezing, SOB/WEBSTER, edema, fever, chills, infection.

## 2023-03-09 ENCOUNTER — APPOINTMENT (OUTPATIENT)
Dept: ENDOCRINOLOGY | Facility: CLINIC | Age: 73
End: 2023-03-09
Payer: MEDICARE

## 2023-03-09 ENCOUNTER — APPOINTMENT (OUTPATIENT)
Dept: CARDIOLOGY | Facility: CLINIC | Age: 73
End: 2023-03-09
Payer: MEDICARE

## 2023-03-09 VITALS
TEMPERATURE: 97 F | OXYGEN SATURATION: 99 % | HEIGHT: 62 IN | BODY MASS INDEX: 29.3 KG/M2 | SYSTOLIC BLOOD PRESSURE: 110 MMHG | WEIGHT: 159.25 LBS | DIASTOLIC BLOOD PRESSURE: 60 MMHG | HEART RATE: 70 BPM

## 2023-03-09 DIAGNOSIS — R94.31 ABNORMAL ELECTROCARDIOGRAM [ECG] [EKG]: ICD-10-CM

## 2023-03-09 DIAGNOSIS — M85.80 OTHER SPECIFIED DISORDERS OF BONE DENSITY AND STRUCTURE, UNSPECIFIED SITE: ICD-10-CM

## 2023-03-09 PROCEDURE — 93015 CV STRESS TEST SUPVJ I&R: CPT

## 2023-03-09 PROCEDURE — 99214 OFFICE O/P EST MOD 30 MIN: CPT

## 2023-03-16 ENCOUNTER — APPOINTMENT (OUTPATIENT)
Dept: CARDIOLOGY | Facility: CLINIC | Age: 73
End: 2023-03-16
Payer: MEDICARE

## 2023-03-16 PROCEDURE — 93015 CV STRESS TEST SUPVJ I&R: CPT

## 2023-03-16 PROCEDURE — A9500: CPT

## 2023-03-16 PROCEDURE — 78452 HT MUSCLE IMAGE SPECT MULT: CPT

## 2023-03-18 NOTE — HISTORY OF PRESENT ILLNESS
[FreeTextEntry1] : Ms. BRAYDEN BRITT is a 71 year old female who returns today in follow up with regard hypothyroidism and Primary HPT.\par \par She is s/p parathyroidectomy per Dr. Goss carried out on 09/18/2020 with calcium normal but PTH still remaining slightly elevated -but had been up to the 200 range pre operatively.\par \par She is currently taking Levothyroxine 75 mcg daily. She has been compliant in taking the LT4 daily, away from food or any medication that may inhibit absorption. She has tolerated this medication well without any apparent adverse effects. She denies any temperature intolerance, significant weight changes. She in addition denies any palpitations, tremors, anxiousness, change in bowel habits or significant change in moods\par \par She reports extreme exhaustion lately, does report not sleeping well at night \par \par . \par Outside labs from [facility] from  02/22/2023 reviewed with LDL at 146 Calcium at 9,9 with too normal ionized calcium and PTH intact at 57 with phosphorous and Magnesium normal . Too, tft's were wnl, not on any statin at this time\par \par Only on Vit D 5000IU once a day and multivitamin, Biotin\par \par Dr. Susana Smalls is her pmd.\par \par She iddi carry out PT. and feeling much stronger and energy much better.\par Does have hx of osteoporosis, had prior elevated urinary calcium and too hx oi fatty liver with mild lft elevations\par \par Weight going down with diet changes At 159-wants to get to 150.\par Somewhat depressed; Lost her job.\par seeing a therapist\par Working remotely.\par \par Had Covid vaccines.\par

## 2023-06-05 ENCOUNTER — APPOINTMENT (OUTPATIENT)
Dept: CARDIOLOGY | Facility: CLINIC | Age: 73
End: 2023-06-05
Payer: MEDICARE

## 2023-06-05 VITALS
HEIGHT: 62 IN | OXYGEN SATURATION: 99 % | DIASTOLIC BLOOD PRESSURE: 62 MMHG | HEART RATE: 66 BPM | SYSTOLIC BLOOD PRESSURE: 120 MMHG | BODY MASS INDEX: 29.26 KG/M2 | WEIGHT: 159 LBS

## 2023-06-05 DIAGNOSIS — I31.39 OTHER PERICARDIAL EFFUSION (NONINFLAMMATORY): ICD-10-CM

## 2023-06-05 DIAGNOSIS — R42 DIZZINESS AND GIDDINESS: ICD-10-CM

## 2023-06-05 PROCEDURE — 99214 OFFICE O/P EST MOD 30 MIN: CPT

## 2023-06-05 NOTE — PHYSICAL EXAM
[Well Developed] : well developed [Well Nourished] : well nourished [No Acute Distress] : no acute distress [Normal Conjunctiva] : normal conjunctiva [Normal Venous Pressure] : normal venous pressure [No Carotid Bruit] : no carotid bruit [Clear Lung Fields] : clear lung fields [Good Air Entry] : good air entry [No Respiratory Distress] : no respiratory distress  [Soft] : abdomen soft [Non Tender] : non-tender [No Masses/organomegaly] : no masses/organomegaly [Normal Bowel Sounds] : normal bowel sounds [Normal Gait] : normal gait [No Edema] : no edema [No Cyanosis] : no cyanosis [No Clubbing] : no clubbing [No Varicosities] : no varicosities [No Rash] : no rash [No Skin Lesions] : no skin lesions [Moves all extremities] : moves all extremities [No Focal Deficits] : no focal deficits [Normal Speech] : normal speech [Alert and Oriented] : alert and oriented [Normal memory] : normal memory [de-identified] : Regular rate and rhythm, NL S1, S2, non-displaced PMI, chest non-tender; no rubs,heaves  or gallops a  Grade 1/6 systolic murmur noted at the LSB\par

## 2023-06-05 NOTE — HISTORY OF PRESENT ILLNESS
[FreeTextEntry1] : This is a 73 year y/o female with a PMHx of HLD, HPT, hypothyroid coming in today for follow up. Pt notes some palpitations at night and states once a week. Pt states she deals with anxiety. Pt states she has been losing her voice with hoarse x 2 years. Pt recent nuclear stress 3/23 showed no evidence of ischemia or infarction. \par \par The patient denies fever, chills, weight loss, malaise, rash, recent travel, insect bites, alteration bowel habits, headaches, weakness, abdominal  pain, bloating, changes in urination, visual disturbances, shortness of breath, chest pain, dizziness, palpitations.\par

## 2023-06-09 PROCEDURE — 93241 XTRNL ECG REC>48HR<7D: CPT

## 2023-06-14 ENCOUNTER — APPOINTMENT (OUTPATIENT)
Dept: PODIATRY | Facility: CLINIC | Age: 73
End: 2023-06-14
Payer: MEDICARE

## 2023-06-14 DIAGNOSIS — B35.1 TINEA UNGUIUM: ICD-10-CM

## 2023-06-14 DIAGNOSIS — M20.10 HALLUX VALGUS (ACQUIRED), UNSPECIFIED FOOT: ICD-10-CM

## 2023-06-14 DIAGNOSIS — L03.031 CELLULITIS OF RIGHT TOE: ICD-10-CM

## 2023-06-14 DIAGNOSIS — L60.0 INGROWING NAIL: ICD-10-CM

## 2023-06-14 DIAGNOSIS — M79.674 PAIN IN RIGHT TOE(S): ICD-10-CM

## 2023-06-14 PROCEDURE — 11750 EXCISION NAIL&NAIL MATRIX: CPT | Mod: T7

## 2023-06-14 PROCEDURE — 99212 OFFICE O/P EST SF 10 MIN: CPT | Mod: 25

## 2023-06-19 PROBLEM — M79.674 PAIN OF TOE OF RIGHT FOOT: Status: ACTIVE | Noted: 2023-06-19

## 2023-06-19 PROBLEM — M20.10 HALLUX VALGUS: Status: ACTIVE | Noted: 2023-06-19

## 2023-06-19 PROBLEM — L60.0 INGROWN NAIL: Status: ACTIVE | Noted: 2023-06-19

## 2023-06-19 PROBLEM — L03.031 PARONYCHIA OF TOENAIL OF RIGHT FOOT: Status: ACTIVE | Noted: 2023-06-19

## 2023-06-19 PROBLEM — B35.1 ONYCHOMYCOSIS: Status: ACTIVE | Noted: 2023-06-19

## 2023-06-19 NOTE — HISTORY OF PRESENT ILLNESS
[Sneakers] : kyle [FreeTextEntry1] : Patient present today after not being seen here in years. She complains of a right ingrown nail that is infected on and off and painful, inflamed 6/10. She complains of bunion on the left side. It is getting bigger but she has had multiple bunion procedures. She also has onychomycosis that causes her grief.

## 2023-06-19 NOTE — PHYSICAL EXAM
[2+] : left foot dorsalis pedis 2+ [Sensation] : the sensory exam was normal to light touch and pinprick [No Focal Deficits] : no focal deficits [Deep Tendon Reflexes (DTR)] : deep tendon reflexes were 2+ and symmetric [Motor Exam] : the motor exam was normal [Ankle Swelling (On Exam)] : not present [Varicose Veins Of Lower Extremities] : not present [Delayed in the Right Toes] : capillary refills normal in right toes [Delayed in the Left Toes] : capillary refills normal in the left toes [FreeTextEntry3] : Hair growth noted on digits. Proximal to distal cooling is within normal limits.  [de-identified] : Left HAV with bunion that is minimally painful. She has previous right bunion and 2nd toe with arthritis but it is not symptomatic. [FreeTextEntry1] : She has onychomycosis in nails 2, 3 and 4 bilateral. They are yellow, thick, brittle with subungual debris and mycosis. The right 3rd toenail is ingrown at the proximal nail fold and fibular nail fold with pyogenic granuloma. and paronychia. There is a little soupy drainage. No pus or infection.

## 2023-06-19 NOTE — ASSESSMENT
[FreeTextEntry1] : \par Impression: Onychomycosis. Right 3rd toe paronychia. Ingrown nail. Pain. Hallux valgus.\par \par Treatment: I went through detailed consent discussing risks, benefits and alternatives to right side 3rd toe total P&A. She consented.\par After signing consent,  Patient was brought into the operating room, placed into a supine position where a local anesthesia was attained in a digital block fashion, approximately 2cc of 1% Xylocaine, plain was utilized.  The patient was then prepped and draped in the usual sterile manner.  Attention was then drawn to right 3rd toe and a Penrose drain was placed about the base. The entire nail was then excised en toto, using a #316 blade and an English nail splitter. Under tourniquet the groove was then chemically cauterized using Phenol; the matrix was cauterized using Phenol, 3 applications at 30 seconds apiece.  This was then flushed copiously with alcohol. An antibiotic dry sterile dressing was applied. Tourniquet removed and normal hyperemic flush was noted to the digit.\par The patient tolerated the procedure and anesthesia well. The patient left this office with neurovascular status intact to the right foot. Patient was given written post-operative instructions. I gave her supplies. She will use Neosporin and do wound care. Any problems she will call the office.  \par I debrided mycotic nails and discussed not fixing the bunion and just wearing accommodative shoes. She will follow-up in the office for evaluation only as needed.\par \par \par  \par

## 2023-06-30 ENCOUNTER — NON-APPOINTMENT (OUTPATIENT)
Age: 73
End: 2023-06-30

## 2023-06-30 PROCEDURE — 93241 XTRNL ECG REC>48HR<7D: CPT

## 2023-08-07 ENCOUNTER — APPOINTMENT (OUTPATIENT)
Dept: CARDIOLOGY | Facility: CLINIC | Age: 73
End: 2023-08-07
Payer: MEDICARE

## 2023-08-07 VITALS
DIASTOLIC BLOOD PRESSURE: 75 MMHG | BODY MASS INDEX: 29.63 KG/M2 | HEART RATE: 69 BPM | TEMPERATURE: 98.4 F | OXYGEN SATURATION: 99 % | HEIGHT: 62 IN | WEIGHT: 161 LBS | SYSTOLIC BLOOD PRESSURE: 120 MMHG

## 2023-08-07 PROCEDURE — 99214 OFFICE O/P EST MOD 30 MIN: CPT

## 2023-08-07 PROCEDURE — 93000 ELECTROCARDIOGRAM COMPLETE: CPT

## 2023-08-07 NOTE — HISTORY OF PRESENT ILLNESS
[FreeTextEntry1] : 74 yo female presents today for follow up.  Pt was complaining of palpitation at last visit s/p extended holter shows SR with 4 beats of NSVT at 106bpm. Pt was hesitant to start toprol as advised but did decrease her coffee and snapple intake and states her palpitations have improved.   Pt states she was in DR last week and had diarrhea, which has now resolved.  She states she is still very fatigued.   Pt following with ENT for hoarseness and sore throat.

## 2023-08-15 ENCOUNTER — NON-APPOINTMENT (OUTPATIENT)
Age: 73
End: 2023-08-15

## 2023-08-18 ENCOUNTER — APPOINTMENT (OUTPATIENT)
Dept: PULMONOLOGY | Facility: CLINIC | Age: 73
End: 2023-08-18
Payer: MEDICARE

## 2023-08-18 VITALS
RESPIRATION RATE: 16 BRPM | DIASTOLIC BLOOD PRESSURE: 76 MMHG | OXYGEN SATURATION: 96 % | SYSTOLIC BLOOD PRESSURE: 113 MMHG | HEART RATE: 80 BPM

## 2023-08-18 PROCEDURE — 99204 OFFICE O/P NEW MOD 45 MIN: CPT | Mod: 25

## 2023-08-18 PROCEDURE — 95012 NITRIC OXIDE EXP GAS DETER: CPT

## 2023-08-18 PROCEDURE — 94010 BREATHING CAPACITY TEST: CPT

## 2023-08-18 RX ORDER — OMEPRAZOLE 20 MG/1
TABLET, DELAYED RELEASE ORAL
Refills: 0 | Status: ACTIVE | COMMUNITY

## 2023-08-18 RX ORDER — FAMOTIDINE 40 MG/1
TABLET, FILM COATED ORAL
Refills: 0 | Status: ACTIVE | COMMUNITY

## 2023-08-18 NOTE — PROCEDURE
[FreeTextEntry1] : ct cardiac 2022- 6mm nodule RUL small noncalcified nodules   august 2023- normal laurie niox 12  previous data reviewed: next gen radiology- ct chest- 2009 next gen radiology ct cet 2011 [facility] ct a/p 2022   speech/swallow noted 8/2023

## 2023-08-18 NOTE — PHYSICAL EXAM
[No Acute Distress] : no acute distress [Well Nourished] : well nourished [Well Developed] : well developed [No Resp Distress] : no resp distress [No Acc Muscle Use] : no acc muscle use [Clear to Auscultation Bilaterally] : clear to auscultation bilaterally [No Edema] : no edema [No Focal Deficits] : no focal deficits [Oriented x3] : oriented x3

## 2023-08-18 NOTE — HISTORY OF PRESENT ILLNESS
[Never] : never [TextBox_4] : BRAYDEN BRITT is a 73 year old female who presents for pulm nodule eval PMH:hyperparathyroid, hypothyrodism , HLD, never smoker, history of pulm nodules,  voice hoarseness, recently seen ENT- needs speech therapy  had ct cardiac done august 2022- noncardiac findigns of nodules has older ct resports from 2009 and 2011  + fatigue + ruminations + SOL prolong on CBD prn to help denies nocturnal awakenings when she does finally fall asleep

## 2023-08-19 ENCOUNTER — APPOINTMENT (OUTPATIENT)
Dept: CT IMAGING | Facility: CLINIC | Age: 73
End: 2023-08-19
Payer: MEDICARE

## 2023-08-19 ENCOUNTER — OUTPATIENT (OUTPATIENT)
Dept: OUTPATIENT SERVICES | Facility: HOSPITAL | Age: 73
LOS: 1 days | End: 2023-08-19
Payer: MEDICARE

## 2023-08-19 DIAGNOSIS — Z98.890 OTHER SPECIFIED POSTPROCEDURAL STATES: Chronic | ICD-10-CM

## 2023-08-19 DIAGNOSIS — H26.9 UNSPECIFIED CATARACT: Chronic | ICD-10-CM

## 2023-08-19 DIAGNOSIS — R53.83 OTHER FATIGUE: ICD-10-CM

## 2023-08-19 DIAGNOSIS — R91.8 OTHER NONSPECIFIC ABNORMAL FINDING OF LUNG FIELD: ICD-10-CM

## 2023-08-19 PROCEDURE — 71250 CT THORAX DX C-: CPT | Mod: 26,MH

## 2023-08-19 PROCEDURE — 71250 CT THORAX DX C-: CPT

## 2023-08-23 ENCOUNTER — APPOINTMENT (OUTPATIENT)
Dept: CARDIOLOGY | Facility: CLINIC | Age: 73
End: 2023-08-23

## 2023-09-20 ENCOUNTER — APPOINTMENT (OUTPATIENT)
Dept: PULMONOLOGY | Facility: CLINIC | Age: 73
End: 2023-09-20
Payer: MEDICARE

## 2023-09-20 ENCOUNTER — NON-APPOINTMENT (OUTPATIENT)
Age: 73
End: 2023-09-20

## 2023-09-20 ENCOUNTER — APPOINTMENT (OUTPATIENT)
Dept: CARDIOLOGY | Facility: CLINIC | Age: 73
End: 2023-09-20
Payer: MEDICARE

## 2023-09-20 VITALS
HEART RATE: 84 BPM | BODY MASS INDEX: 29.92 KG/M2 | HEIGHT: 62 IN | DIASTOLIC BLOOD PRESSURE: 65 MMHG | WEIGHT: 162.56 LBS | SYSTOLIC BLOOD PRESSURE: 100 MMHG | OXYGEN SATURATION: 95 % | RESPIRATION RATE: 16 BRPM

## 2023-09-20 VITALS — TEMPERATURE: 98.9 F

## 2023-09-20 DIAGNOSIS — R07.89 OTHER CHEST PAIN: ICD-10-CM

## 2023-09-20 DIAGNOSIS — R00.2 PALPITATIONS: ICD-10-CM

## 2023-09-20 DIAGNOSIS — R91.8 OTHER NONSPECIFIC ABNORMAL FINDING OF LUNG FIELD: ICD-10-CM

## 2023-09-20 PROCEDURE — 99214 OFFICE O/P EST MOD 30 MIN: CPT

## 2023-09-20 PROCEDURE — 93000 ELECTROCARDIOGRAM COMPLETE: CPT

## 2023-09-20 RX ORDER — AZITHROMYCIN 500 MG/1
500 TABLET, FILM COATED ORAL DAILY
Qty: 7 | Refills: 0 | Status: ACTIVE | COMMUNITY
Start: 2023-09-20 | End: 1900-01-01

## 2023-09-20 RX ORDER — PREDNISONE 20 MG/1
20 TABLET ORAL
Qty: 10 | Refills: 0 | Status: ACTIVE | COMMUNITY
Start: 2023-09-20 | End: 1900-01-01

## 2023-09-22 LAB
ANA SER IF-ACNC: NEGATIVE
ERYTHROCYTE [SEDIMENTATION RATE] IN BLOOD BY WESTERGREN METHOD: 23 MM/HR

## 2023-09-28 ENCOUNTER — APPOINTMENT (OUTPATIENT)
Dept: OTOLARYNGOLOGY | Facility: CLINIC | Age: 73
End: 2023-09-28
Payer: MEDICARE

## 2023-09-28 PROCEDURE — 92524 BEHAVRAL QUALIT ANALYS VOICE: CPT | Mod: GN

## 2023-09-28 PROCEDURE — 92520 LARYNGEAL FUNCTION STUDIES: CPT | Mod: GN

## 2023-10-03 ENCOUNTER — NON-APPOINTMENT (OUTPATIENT)
Age: 73
End: 2023-10-03

## 2023-10-03 ENCOUNTER — RESULT CHARGE (OUTPATIENT)
Age: 73
End: 2023-10-03

## 2023-10-04 ENCOUNTER — APPOINTMENT (OUTPATIENT)
Dept: PULMONOLOGY | Facility: CLINIC | Age: 73
End: 2023-10-04
Payer: MEDICARE

## 2023-10-04 VITALS — OXYGEN SATURATION: 95 % | HEART RATE: 80 BPM

## 2023-10-04 VITALS — DIASTOLIC BLOOD PRESSURE: 78 MMHG | SYSTOLIC BLOOD PRESSURE: 116 MMHG

## 2023-10-04 PROCEDURE — 94010 BREATHING CAPACITY TEST: CPT

## 2023-10-04 PROCEDURE — ZZZZZ: CPT

## 2023-10-04 PROCEDURE — 99214 OFFICE O/P EST MOD 30 MIN: CPT | Mod: 25

## 2023-10-04 PROCEDURE — 36415 COLL VENOUS BLD VENIPUNCTURE: CPT

## 2023-10-04 PROCEDURE — 94618 PULMONARY STRESS TESTING: CPT

## 2023-10-04 PROCEDURE — 94729 DIFFUSING CAPACITY: CPT

## 2023-10-04 PROCEDURE — 94727 GAS DIL/WSHOT DETER LNG VOL: CPT

## 2023-10-04 PROCEDURE — 95012 NITRIC OXIDE EXP GAS DETER: CPT

## 2023-10-04 RX ORDER — ASPIRIN ENTERIC COATED TABLETS 81 MG 81 MG/1
81 TABLET, DELAYED RELEASE ORAL
Qty: 30 | Refills: 0 | Status: COMPLETED | COMMUNITY
Start: 2023-03-09 | End: 2023-10-04

## 2023-10-05 LAB
24R-OH-CALCIDIOL SERPL-MCNC: 79.2 PG/ML
25(OH)D3 SERPL-MCNC: 49.1 NG/ML
ACE BLD-CCNC: 28 U/L
ANION GAP SERPL CALC-SCNC: 12 MMOL/L
BUN SERPL-MCNC: 18 MG/DL
CALCIUM SERPL-MCNC: 10.1 MG/DL
CALCIUM SERPL-MCNC: 10.1 MG/DL
CHLORIDE SERPL-SCNC: 105 MMOL/L
CO2 SERPL-SCNC: 26 MMOL/L
CREAT SERPL-MCNC: 0.87 MG/DL
DEPRECATED D DIMER PPP IA-ACNC: <150 NG/ML DDU
EGFR: 71 ML/MIN/1.73M2
GLUCOSE SERPL-MCNC: 95 MG/DL
NT-PROBNP SERPL-MCNC: 225 PG/ML
PARATHYROID HORMONE INTACT: 42 PG/ML
POTASSIUM SERPL-SCNC: 5.2 MMOL/L
SODIUM SERPL-SCNC: 143 MMOL/L

## 2023-10-05 RX ORDER — FLUTICASONE FUROATE 200 UG/1
200 POWDER RESPIRATORY (INHALATION)
Qty: 30 | Refills: 5 | Status: ACTIVE | COMMUNITY
Start: 2023-10-05 | End: 1900-01-01

## 2023-10-05 RX ORDER — FLUTICASONE PROPIONATE 220 UG/1
220 AEROSOL, METERED RESPIRATORY (INHALATION)
Qty: 1 | Refills: 0 | Status: ACTIVE | COMMUNITY
Start: 2023-10-04 | End: 1900-01-01

## 2023-10-10 ENCOUNTER — APPOINTMENT (OUTPATIENT)
Dept: OTOLARYNGOLOGY | Facility: CLINIC | Age: 73
End: 2023-10-10
Payer: MEDICARE

## 2023-10-10 PROCEDURE — 92507 TX SP LANG VOICE COMM INDIV: CPT | Mod: GN

## 2023-10-18 ENCOUNTER — APPOINTMENT (OUTPATIENT)
Dept: OTOLARYNGOLOGY | Facility: CLINIC | Age: 73
End: 2023-10-18
Payer: MEDICARE

## 2023-10-18 ENCOUNTER — NON-APPOINTMENT (OUTPATIENT)
Age: 73
End: 2023-10-18

## 2023-10-18 PROCEDURE — 92507 TX SP LANG VOICE COMM INDIV: CPT | Mod: GN

## 2023-10-19 ENCOUNTER — NON-APPOINTMENT (OUTPATIENT)
Age: 73
End: 2023-10-19

## 2023-10-27 ENCOUNTER — APPOINTMENT (OUTPATIENT)
Dept: OTOLARYNGOLOGY | Facility: CLINIC | Age: 73
End: 2023-10-27
Payer: MEDICARE

## 2023-10-27 ENCOUNTER — APPOINTMENT (OUTPATIENT)
Dept: PULMONOLOGY | Facility: CLINIC | Age: 73
End: 2023-10-27
Payer: MEDICARE

## 2023-10-27 VITALS — DIASTOLIC BLOOD PRESSURE: 81 MMHG | OXYGEN SATURATION: 98 % | HEART RATE: 91 BPM | SYSTOLIC BLOOD PRESSURE: 117 MMHG

## 2023-10-27 PROCEDURE — 99214 OFFICE O/P EST MOD 30 MIN: CPT

## 2023-10-27 PROCEDURE — 92507 TX SP LANG VOICE COMM INDIV: CPT | Mod: GN

## 2023-10-27 NOTE — H&P PST ADULT - HEALTH CARE MAINTENANCE
Scheduled appt with [facility] for appt for papiladema per Dr. Brennan however patient declines pursuing this right now so cancelled appt.   Breath sounds clear and equal bilaterally. colonoscopy  2019

## 2023-11-02 ENCOUNTER — APPOINTMENT (OUTPATIENT)
Dept: OTOLARYNGOLOGY | Facility: CLINIC | Age: 73
End: 2023-11-02
Payer: MEDICARE

## 2023-11-02 PROCEDURE — 92507 TX SP LANG VOICE COMM INDIV: CPT | Mod: GN

## 2023-11-08 ENCOUNTER — APPOINTMENT (OUTPATIENT)
Dept: OTOLARYNGOLOGY | Facility: CLINIC | Age: 73
End: 2023-11-08
Payer: MEDICARE

## 2023-11-08 PROCEDURE — 92507 TX SP LANG VOICE COMM INDIV: CPT | Mod: GN

## 2023-11-14 ENCOUNTER — APPOINTMENT (OUTPATIENT)
Dept: OTOLARYNGOLOGY | Facility: CLINIC | Age: 73
End: 2023-11-14
Payer: MEDICARE

## 2023-11-14 PROCEDURE — 92507 TX SP LANG VOICE COMM INDIV: CPT | Mod: GN

## 2023-12-08 ENCOUNTER — APPOINTMENT (OUTPATIENT)
Dept: OTOLARYNGOLOGY | Facility: CLINIC | Age: 73
End: 2023-12-08

## 2023-12-18 ENCOUNTER — APPOINTMENT (OUTPATIENT)
Dept: CARDIOLOGY | Facility: CLINIC | Age: 73
End: 2023-12-18
Payer: MEDICARE

## 2023-12-18 ENCOUNTER — NON-APPOINTMENT (OUTPATIENT)
Age: 73
End: 2023-12-18

## 2023-12-18 VITALS
OXYGEN SATURATION: 99 % | HEART RATE: 88 BPM | SYSTOLIC BLOOD PRESSURE: 110 MMHG | DIASTOLIC BLOOD PRESSURE: 60 MMHG | HEIGHT: 62 IN

## 2023-12-18 DIAGNOSIS — R53.83 OTHER FATIGUE: ICD-10-CM

## 2023-12-18 DIAGNOSIS — R49.0 DYSPHONIA: ICD-10-CM

## 2023-12-18 DIAGNOSIS — D86.9 SARCOIDOSIS, UNSPECIFIED: ICD-10-CM

## 2023-12-18 PROCEDURE — 99214 OFFICE O/P EST MOD 30 MIN: CPT

## 2023-12-18 PROCEDURE — 93000 ELECTROCARDIOGRAM COMPLETE: CPT

## 2023-12-18 PROCEDURE — 93306 TTE W/DOPPLER COMPLETE: CPT

## 2023-12-20 NOTE — HISTORY OF PRESENT ILLNESS
[FreeTextEntry1] : BRAYDEN BRITT is a 73 year old F w/ pmhx of hypothyroidism, hyperparathydoirism, HLD, suspected Sarcoidosis who presents today for routine follow up.   The patient denies fever, chills, sore throat, loss of taste or smell, muscle aches weight loss, malaise, rash, recent travel, insect bites, alteration bowel habits, headaches, weakness, abdominal pain, bloating, changes in urination, visual disturbances, , dizziness, palpitations.  The patient is here for follow-up of hypothyroidism. The patient states that they are taking their medicine regularly that they have no new complaints. They deny any heat or cold intolerance hair loss and fatigue. The patient is here for follow-up of and ongoing management elevated cholesterol. Patient is currently tolerating medication and denies muscle pain, joint pain, back pain, urinary changes , nausea, vomiting, abdominal pain or diarrhea. The patient is trying to follow a low cholesterol diet.  Pt also c/o chronic fatigue, difficulty sleeping at night, takes CBD prn Pt seeing ENT Dr. Mata Rojas for GERD, c/o hoarseness  s/p labs ldl 134  TTE in office today

## 2023-12-21 ENCOUNTER — APPOINTMENT (OUTPATIENT)
Dept: ENDOCRINOLOGY | Facility: CLINIC | Age: 73
End: 2023-12-21
Payer: MEDICARE

## 2023-12-21 VITALS
SYSTOLIC BLOOD PRESSURE: 110 MMHG | BODY MASS INDEX: 29.83 KG/M2 | HEIGHT: 62 IN | WEIGHT: 162.13 LBS | HEART RATE: 95 BPM | TEMPERATURE: 98 F | DIASTOLIC BLOOD PRESSURE: 70 MMHG | OXYGEN SATURATION: 96 %

## 2023-12-21 DIAGNOSIS — E21.3 HYPERPARATHYROIDISM, UNSPECIFIED: ICD-10-CM

## 2023-12-21 DIAGNOSIS — M81.0 AGE-RELATED OSTEOPOROSIS W/OUT CURRENT PATHOLOGICAL FRACTURE: ICD-10-CM

## 2023-12-21 DIAGNOSIS — E03.9 HYPOTHYROIDISM, UNSPECIFIED: ICD-10-CM

## 2023-12-21 DIAGNOSIS — E21.0 PRIMARY HYPERPARATHYROIDISM: ICD-10-CM

## 2023-12-21 DIAGNOSIS — E78.00 PURE HYPERCHOLESTEROLEMIA, UNSPECIFIED: ICD-10-CM

## 2023-12-21 DIAGNOSIS — R53.82 CHRONIC FATIGUE, UNSPECIFIED: ICD-10-CM

## 2023-12-21 DIAGNOSIS — E55.9 VITAMIN D DEFICIENCY, UNSPECIFIED: ICD-10-CM

## 2023-12-21 PROCEDURE — 36415 COLL VENOUS BLD VENIPUNCTURE: CPT

## 2023-12-21 PROCEDURE — 99214 OFFICE O/P EST MOD 30 MIN: CPT

## 2023-12-21 RX ORDER — LEVOTHYROXINE SODIUM 0.07 MG/1
75 TABLET ORAL
Qty: 90 | Refills: 2 | Status: ACTIVE | COMMUNITY
Start: 2019-12-12 | End: 1900-01-01

## 2023-12-22 LAB
25(OH)D3 SERPL-MCNC: 56.6 NG/ML
VIT B12 SERPL-MCNC: 644 PG/ML

## 2023-12-30 NOTE — HISTORY OF PRESENT ILLNESS
[FreeTextEntry1] : Ms. BRAYDEN BRITT is a 73 year old female who returns today in follow up with regard hypothyroidism and Primary HPT.  She is s/p parathyroidectomy per Dr. Goss carried out on 09/18/2020 with calcium normal but PTH still remaining slightly elevated -but had been up to the 200 range pre operatively.  She is currently taking Levothyroxine 75 mcg. She has been compliant in taking the LT4 daily, away from food or any medication that may inhibit absorption. She has tolerated this medication well without any apparent adverse effects. She denies any temperature intolerance, significant weight changes, or severe fatigue. She in addition denies any palpitations, tremors, anxiousness, change in bowel habits or significant change in moods  She does note of recent night sweats and frequent urination throughout the day over the past few months.   Labs from [facility] from 12/6/2023 showed serum calcium at 9.4 with PTH intact at 82.  Is now taking one calcium pill 333 mg and multi with 200 mg Ca Vitamin D not done by lab Bone markers were acceptable but CTX was up at 528. TSH: 2.42      Free T4: 1.3      Free t3: 3.5  Dr. Susana Smalls is her pmd.  Does have hx of osteoporosis, had prior elevated urinary calcium and too hx oi fatty liver with mild lft elevations  Weight going down with diet changes At 162-wants to get to 150. Somewhat depressed; Lost her job. seeing a therapist Working remotely.  Had Covid vaccines. She was placed on Rosuvastatin

## 2024-07-19 ENCOUNTER — APPOINTMENT (OUTPATIENT)
Dept: NEUROLOGY | Facility: CLINIC | Age: 74
End: 2024-07-19

## 2024-10-21 ENCOUNTER — NON-APPOINTMENT (OUTPATIENT)
Age: 74
End: 2024-10-21

## 2024-10-21 DIAGNOSIS — E03.9 HYPOTHYROIDISM, UNSPECIFIED: ICD-10-CM

## 2024-11-05 ENCOUNTER — APPOINTMENT (OUTPATIENT)
Dept: ENDOCRINOLOGY | Facility: CLINIC | Age: 74
End: 2024-11-05
Payer: MEDICARE

## 2024-11-05 VITALS
HEIGHT: 62 IN | OXYGEN SATURATION: 97 % | SYSTOLIC BLOOD PRESSURE: 118 MMHG | HEART RATE: 85 BPM | WEIGHT: 170.13 LBS | DIASTOLIC BLOOD PRESSURE: 78 MMHG | BODY MASS INDEX: 31.31 KG/M2

## 2024-11-05 DIAGNOSIS — E03.9 HYPOTHYROIDISM, UNSPECIFIED: ICD-10-CM

## 2024-11-05 DIAGNOSIS — M85.80 OTHER SPECIFIED DISORDERS OF BONE DENSITY AND STRUCTURE, UNSPECIFIED SITE: ICD-10-CM

## 2024-11-05 DIAGNOSIS — E55.9 VITAMIN D DEFICIENCY, UNSPECIFIED: ICD-10-CM

## 2024-11-05 DIAGNOSIS — E78.00 PURE HYPERCHOLESTEROLEMIA, UNSPECIFIED: ICD-10-CM

## 2024-11-05 DIAGNOSIS — E21.0 PRIMARY HYPERPARATHYROIDISM: ICD-10-CM

## 2024-11-05 PROCEDURE — G2211 COMPLEX E/M VISIT ADD ON: CPT

## 2024-11-05 PROCEDURE — 99214 OFFICE O/P EST MOD 30 MIN: CPT

## 2024-12-18 ENCOUNTER — APPOINTMENT (OUTPATIENT)
Dept: CARDIOLOGY | Facility: CLINIC | Age: 74
End: 2024-12-18
Payer: MEDICARE

## 2024-12-18 VITALS
HEART RATE: 74 BPM | SYSTOLIC BLOOD PRESSURE: 114 MMHG | WEIGHT: 174 LBS | TEMPERATURE: 98.3 F | BODY MASS INDEX: 32.02 KG/M2 | DIASTOLIC BLOOD PRESSURE: 80 MMHG | OXYGEN SATURATION: 96 % | HEIGHT: 62 IN

## 2024-12-18 DIAGNOSIS — R63.5 ABNORMAL WEIGHT GAIN: ICD-10-CM

## 2024-12-18 DIAGNOSIS — E78.00 PURE HYPERCHOLESTEROLEMIA, UNSPECIFIED: ICD-10-CM

## 2024-12-18 DIAGNOSIS — E21.3 HYPERPARATHYROIDISM, UNSPECIFIED: ICD-10-CM

## 2024-12-18 DIAGNOSIS — I07.1 RHEUMATIC TRICUSPID INSUFFICIENCY: ICD-10-CM

## 2024-12-18 DIAGNOSIS — D86.9 SARCOIDOSIS, UNSPECIFIED: ICD-10-CM

## 2024-12-18 DIAGNOSIS — K76.0 FATTY (CHANGE OF) LIVER, NOT ELSEWHERE CLASSIFIED: ICD-10-CM

## 2024-12-18 PROCEDURE — 99214 OFFICE O/P EST MOD 30 MIN: CPT

## 2024-12-18 PROCEDURE — 93000 ELECTROCARDIOGRAM COMPLETE: CPT

## 2024-12-18 PROCEDURE — G2211 COMPLEX E/M VISIT ADD ON: CPT

## 2024-12-31 NOTE — H&P PST ADULT - PATIENT ON (OXYGEN DELIVERY METHOD)
Patient called stating he has not received a call back regarding message below.  Please call pt    room air

## 2025-01-30 ENCOUNTER — APPOINTMENT (OUTPATIENT)
Dept: CARDIOLOGY | Facility: CLINIC | Age: 75
End: 2025-01-30
Payer: MEDICARE

## 2025-01-30 ENCOUNTER — APPOINTMENT (OUTPATIENT)
Dept: CARDIOLOGY | Facility: CLINIC | Age: 75
End: 2025-01-30

## 2025-01-30 VITALS
HEART RATE: 85 BPM | SYSTOLIC BLOOD PRESSURE: 116 MMHG | HEIGHT: 62 IN | BODY MASS INDEX: 31.28 KG/M2 | DIASTOLIC BLOOD PRESSURE: 80 MMHG | OXYGEN SATURATION: 98 % | WEIGHT: 170 LBS | TEMPERATURE: 98.1 F

## 2025-01-30 DIAGNOSIS — I31.39 OTHER PERICARDIAL EFFUSION (NONINFLAMMATORY): ICD-10-CM

## 2025-01-30 DIAGNOSIS — R63.5 ABNORMAL WEIGHT GAIN: ICD-10-CM

## 2025-01-30 DIAGNOSIS — K76.0 FATTY (CHANGE OF) LIVER, NOT ELSEWHERE CLASSIFIED: ICD-10-CM

## 2025-01-30 DIAGNOSIS — E78.00 PURE HYPERCHOLESTEROLEMIA, UNSPECIFIED: ICD-10-CM

## 2025-01-30 DIAGNOSIS — D86.9 SARCOIDOSIS, UNSPECIFIED: ICD-10-CM

## 2025-01-30 DIAGNOSIS — E03.9 HYPOTHYROIDISM, UNSPECIFIED: ICD-10-CM

## 2025-01-30 DIAGNOSIS — Z01.818 ENCOUNTER FOR OTHER PREPROCEDURAL EXAMINATION: ICD-10-CM

## 2025-01-30 DIAGNOSIS — I07.1 RHEUMATIC TRICUSPID INSUFFICIENCY: ICD-10-CM

## 2025-01-30 DIAGNOSIS — R79.1 ABNORMAL COAGULATION PROFILE: ICD-10-CM

## 2025-01-30 DIAGNOSIS — E21.3 HYPERPARATHYROIDISM, UNSPECIFIED: ICD-10-CM

## 2025-01-30 PROCEDURE — 93321 DOPPLER ECHO F-UP/LMTD STD: CPT | Mod: 59

## 2025-01-30 PROCEDURE — G2211 COMPLEX E/M VISIT ADD ON: CPT

## 2025-01-30 PROCEDURE — 99214 OFFICE O/P EST MOD 30 MIN: CPT

## 2025-01-30 PROCEDURE — 93306 TTE W/DOPPLER COMPLETE: CPT | Mod: 59

## 2025-01-30 PROCEDURE — 93351 STRESS TTE COMPLETE: CPT

## 2025-01-30 PROCEDURE — 93325 DOPPLER ECHO COLOR FLOW MAPG: CPT | Mod: 59

## 2025-01-31 ENCOUNTER — NON-APPOINTMENT (OUTPATIENT)
Age: 75
End: 2025-01-31

## 2025-01-31 PROBLEM — R79.1 ELEVATED PARTIAL THROMBOPLASTIN TIME (PTT): Status: ACTIVE | Noted: 2025-01-31

## 2025-01-31 LAB
25(OH)D3 SERPL-MCNC: 59 NG/ML
ALBUMIN SERPL ELPH-MCNC: 4.8 G/DL
ALP BLD-CCNC: 97 U/L
ALT SERPL-CCNC: 26 U/L
ANION GAP SERPL CALC-SCNC: 11 MMOL/L
APPEARANCE: CLEAR
APTT BLD: 36.5 SEC
AST SERPL-CCNC: 19 U/L
BACTERIA: NEGATIVE /HPF
BASOPHILS # BLD AUTO: 0.02 K/UL
BASOPHILS NFR BLD AUTO: 0.2 %
BILIRUB DIRECT SERPL-MCNC: 0.1 MG/DL
BILIRUB INDIRECT SERPL-MCNC: 0.3 MG/DL
BILIRUB SERPL-MCNC: 0.4 MG/DL
BILIRUBIN URINE: NEGATIVE
BLOOD URINE: NEGATIVE
BUN SERPL-MCNC: 18 MG/DL
CALCIUM SERPL-MCNC: 10.1 MG/DL
CAST: 0 /LPF
CHLORIDE SERPL-SCNC: 102 MMOL/L
CHOLEST SERPL-MCNC: 236 MG/DL
CK SERPL-CCNC: 44 U/L
CO2 SERPL-SCNC: 26 MMOL/L
COLOR: YELLOW
CREAT SERPL-MCNC: 0.93 MG/DL
CRP SERPL HS-MCNC: 0.62 MG/L
EGFR: 65 ML/MIN/1.73M2
EOSINOPHIL # BLD AUTO: 0.32 K/UL
EOSINOPHIL NFR BLD AUTO: 3.8 %
EPITHELIAL CELLS: 4 /HPF
ESTIMATED AVERAGE GLUCOSE: 108 MG/DL
GLUCOSE QUALITATIVE U: NEGATIVE MG/DL
GLUCOSE SERPL-MCNC: 93 MG/DL
HBA1C MFR BLD HPLC: 5.4 %
HCT VFR BLD CALC: 44.1 %
HDLC SERPL-MCNC: 77 MG/DL
HGB BLD-MCNC: 13.9 G/DL
IMM GRANULOCYTES NFR BLD AUTO: 0.4 %
INR PPP: 0.89 RATIO
KETONES URINE: NEGATIVE MG/DL
LDLC SERPL CALC-MCNC: 133 MG/DL
LDLC SERPL DIRECT ASSAY-MCNC: 130 MG/DL
LEUKOCYTE ESTERASE URINE: ABNORMAL
LYMPHOCYTES # BLD AUTO: 2.2 K/UL
LYMPHOCYTES NFR BLD AUTO: 26.3 %
MAGNESIUM SERPL-MCNC: 2.2 MG/DL
MAN DIFF?: NORMAL
MCHC RBC-ENTMCNC: 29.4 PG
MCHC RBC-ENTMCNC: 31.5 G/DL
MCV RBC AUTO: 93.4 FL
MICROSCOPIC-UA: NORMAL
MONOCYTES # BLD AUTO: 0.62 K/UL
MONOCYTES NFR BLD AUTO: 7.4 %
NEUTROPHILS # BLD AUTO: 5.17 K/UL
NEUTROPHILS NFR BLD AUTO: 61.9 %
NITRITE URINE: NEGATIVE
NONHDLC SERPL-MCNC: 158 MG/DL
PH URINE: 6
PLATELET # BLD AUTO: 284 K/UL
POTASSIUM SERPL-SCNC: 4.9 MMOL/L
PROT SERPL-MCNC: 7 G/DL
PROTEIN URINE: NEGATIVE MG/DL
PT BLD: 10.6 SEC
RBC # BLD: 4.72 M/UL
RBC # FLD: 13.2 %
RED BLOOD CELLS URINE: 0 /HPF
REVIEW: NORMAL
SODIUM SERPL-SCNC: 139 MMOL/L
SPECIFIC GRAVITY URINE: 1.01
T4 FREE SERPL-MCNC: 1.5 NG/DL
TRIGL SERPL-MCNC: 145 MG/DL
TSH SERPL-ACNC: 2.18 UIU/ML
UROBILINOGEN URINE: 0.2 MG/DL
WBC # FLD AUTO: 8.36 K/UL
WHITE BLOOD CELLS URINE: 2 /HPF

## 2025-02-03 LAB
APTT 2H P 1:4 NP PPP: 33.7 SEC
APTT 2H P INC PPP: 34.5 SEC
APTT IMM NP/PRE NP PPP: 34.7 SEC
APTT INV RATIO PPP: 37.4 SEC
NPP NORMAL POOLED PLASMA: 32 SECS

## 2025-03-31 ENCOUNTER — APPOINTMENT (OUTPATIENT)
Dept: PODIATRY | Facility: CLINIC | Age: 75
End: 2025-03-31

## 2025-03-31 DIAGNOSIS — L60.0 INGROWING NAIL: ICD-10-CM

## 2025-03-31 DIAGNOSIS — L98.0 PYOGENIC GRANULOMA: ICD-10-CM

## 2025-03-31 PROCEDURE — 11750 EXCISION NAIL&NAIL MATRIX: CPT | Mod: T7

## 2025-04-04 PROBLEM — L98.0 PYOGENIC GRANULOMA: Status: ACTIVE | Noted: 2025-03-31

## 2025-04-10 ENCOUNTER — APPOINTMENT (OUTPATIENT)
Dept: PODIATRY | Facility: CLINIC | Age: 75
End: 2025-04-10

## 2025-04-10 DIAGNOSIS — M79.674 PAIN IN RIGHT TOE(S): ICD-10-CM

## 2025-04-10 PROCEDURE — 99212 OFFICE O/P EST SF 10 MIN: CPT | Mod: 24

## 2025-04-11 LAB — CORE LAB BIOPSY: NORMAL

## 2025-08-04 ENCOUNTER — NON-APPOINTMENT (OUTPATIENT)
Age: 75
End: 2025-08-04

## 2025-08-06 ENCOUNTER — APPOINTMENT (OUTPATIENT)
Dept: CARDIOLOGY | Facility: CLINIC | Age: 75
End: 2025-08-06
Payer: MEDICARE

## 2025-08-06 ENCOUNTER — APPOINTMENT (OUTPATIENT)
Dept: ENDOCRINOLOGY | Facility: CLINIC | Age: 75
End: 2025-08-06

## 2025-08-06 VITALS
HEART RATE: 70 BPM | HEIGHT: 62 IN | DIASTOLIC BLOOD PRESSURE: 70 MMHG | WEIGHT: 166.56 LBS | TEMPERATURE: 98.1 F | SYSTOLIC BLOOD PRESSURE: 115 MMHG | OXYGEN SATURATION: 96 % | BODY MASS INDEX: 30.65 KG/M2

## 2025-08-06 VITALS
OXYGEN SATURATION: 99 % | HEIGHT: 62 IN | SYSTOLIC BLOOD PRESSURE: 110 MMHG | WEIGHT: 168 LBS | HEART RATE: 64 BPM | BODY MASS INDEX: 30.91 KG/M2 | DIASTOLIC BLOOD PRESSURE: 78 MMHG

## 2025-08-06 DIAGNOSIS — E21.3 HYPERPARATHYROIDISM, UNSPECIFIED: ICD-10-CM

## 2025-08-06 DIAGNOSIS — E21.0 PRIMARY HYPERPARATHYROIDISM: ICD-10-CM

## 2025-08-06 DIAGNOSIS — E03.9 HYPOTHYROIDISM, UNSPECIFIED: ICD-10-CM

## 2025-08-06 DIAGNOSIS — I31.39 OTHER PERICARDIAL EFFUSION (NONINFLAMMATORY): ICD-10-CM

## 2025-08-06 DIAGNOSIS — R00.2 PALPITATIONS: ICD-10-CM

## 2025-08-06 DIAGNOSIS — C44.92 SQUAMOUS CELL CARCINOMA OF SKIN, UNSPECIFIED: ICD-10-CM

## 2025-08-06 DIAGNOSIS — E55.9 VITAMIN D DEFICIENCY, UNSPECIFIED: ICD-10-CM

## 2025-08-06 DIAGNOSIS — R06.09 OTHER FORMS OF DYSPNEA: ICD-10-CM

## 2025-08-06 DIAGNOSIS — G47.00 INSOMNIA, UNSPECIFIED: ICD-10-CM

## 2025-08-06 DIAGNOSIS — E53.8 DEFICIENCY OF OTHER SPECIFIED B GROUP VITAMINS: ICD-10-CM

## 2025-08-06 DIAGNOSIS — D86.9 SARCOIDOSIS, UNSPECIFIED: ICD-10-CM

## 2025-08-06 DIAGNOSIS — M85.80 OTHER SPECIFIED DISORDERS OF BONE DENSITY AND STRUCTURE, UNSPECIFIED SITE: ICD-10-CM

## 2025-08-06 DIAGNOSIS — E78.00 PURE HYPERCHOLESTEROLEMIA, UNSPECIFIED: ICD-10-CM

## 2025-08-06 PROCEDURE — 93306 TTE W/DOPPLER COMPLETE: CPT

## 2025-08-06 PROCEDURE — G2211 COMPLEX E/M VISIT ADD ON: CPT

## 2025-08-06 PROCEDURE — 99214 OFFICE O/P EST MOD 30 MIN: CPT

## 2025-08-06 PROCEDURE — 93000 ELECTROCARDIOGRAM COMPLETE: CPT

## 2025-08-07 LAB
ESTIMATED AVERAGE GLUCOSE: 105 MG/DL
HBA1C MFR BLD HPLC: 5.3 %
NT-PROBNP SERPL-MCNC: 115 PG/ML
VIT B12 SERPL-MCNC: 508 PG/ML

## 2025-08-08 LAB
ALBUMIN SERPL ELPH-MCNC: 4.7 G/DL
ALP BLD-CCNC: 99 U/L
ALT SERPL-CCNC: 25 U/L
ANION GAP SERPL CALC-SCNC: 18 MMOL/L
AST SERPL-CCNC: 18 U/L
BILIRUB DIRECT SERPL-MCNC: 0.11 MG/DL
BILIRUB INDIRECT SERPL-MCNC: 0.2 MG/DL
BILIRUB SERPL-MCNC: 0.3 MG/DL
BUN SERPL-MCNC: 17 MG/DL
CALCIUM SERPL-MCNC: 9.9 MG/DL
CHLORIDE SERPL-SCNC: 102 MMOL/L
CHOLEST SERPL-MCNC: 239 MG/DL
CK SERPL-CCNC: 72 U/L
CO2 SERPL-SCNC: 22 MMOL/L
CREAT SERPL-MCNC: 0.77 MG/DL
EGFRCR SERPLBLD CKD-EPI 2021: 81 ML/MIN/1.73M2
GLUCOSE SERPL-MCNC: 57 MG/DL
HDLC SERPL-MCNC: 76 MG/DL
LDLC SERPL DIRECT ASSAY-MCNC: 140 MG/DL
LDLC SERPL-MCNC: 143 MG/DL
NONHDLC SERPL-MCNC: 163 MG/DL
POTASSIUM SERPL-SCNC: 4.9 MMOL/L
PROT SERPL-MCNC: 6.9 G/DL
SODIUM SERPL-SCNC: 142 MMOL/L
TRIGL SERPL-MCNC: 118 MG/DL

## 2025-09-09 ENCOUNTER — APPOINTMENT (OUTPATIENT)
Dept: CARDIOLOGY | Facility: CLINIC | Age: 75
End: 2025-09-09
Payer: MEDICARE

## 2025-09-09 VITALS
HEART RATE: 85 BPM | SYSTOLIC BLOOD PRESSURE: 130 MMHG | RESPIRATION RATE: 18 BRPM | DIASTOLIC BLOOD PRESSURE: 78 MMHG | WEIGHT: 168.6 LBS | BODY MASS INDEX: 31.03 KG/M2 | OXYGEN SATURATION: 99 % | TEMPERATURE: 97 F | HEIGHT: 62 IN

## 2025-09-09 DIAGNOSIS — R00.2 PALPITATIONS: ICD-10-CM

## 2025-09-09 DIAGNOSIS — R06.09 OTHER FORMS OF DYSPNEA: ICD-10-CM

## 2025-09-09 DIAGNOSIS — E78.00 PURE HYPERCHOLESTEROLEMIA, UNSPECIFIED: ICD-10-CM

## 2025-09-09 DIAGNOSIS — E78.5 HYPERLIPIDEMIA, UNSPECIFIED: ICD-10-CM

## 2025-09-09 DIAGNOSIS — R21 RASH AND OTHER NONSPECIFIC SKIN ERUPTION: ICD-10-CM

## 2025-09-09 PROCEDURE — 93015 CV STRESS TEST SUPVJ I&R: CPT

## 2025-09-09 PROCEDURE — 99214 OFFICE O/P EST MOD 30 MIN: CPT | Mod: 25
